# Patient Record
Sex: MALE | Race: OTHER | Employment: OTHER | URBAN - METROPOLITAN AREA
[De-identification: names, ages, dates, MRNs, and addresses within clinical notes are randomized per-mention and may not be internally consistent; named-entity substitution may affect disease eponyms.]

---

## 2018-06-22 ENCOUNTER — HOSPITAL ENCOUNTER (EMERGENCY)
Facility: HOSPITAL | Age: 58
Discharge: HOME | End: 2018-06-22
Attending: PHYSICIAN ASSISTANT
Payer: COMMERCIAL

## 2018-06-22 ENCOUNTER — APPOINTMENT (OUTPATIENT)
Dept: CT IMAGING | Facility: HOSPITAL | Age: 58
End: 2018-06-22
Attending: PHYSICIAN ASSISTANT
Payer: COMMERCIAL

## 2018-06-22 VITALS
HEIGHT: 69 IN | SYSTOLIC BLOOD PRESSURE: 121 MMHG | DIASTOLIC BLOOD PRESSURE: 90 MMHG | RESPIRATION RATE: 18 BRPM | BODY MASS INDEX: 30.36 KG/M2 | OXYGEN SATURATION: 96 % | WEIGHT: 205 LBS | TEMPERATURE: 98 F | HEART RATE: 70 BPM

## 2018-06-22 DIAGNOSIS — D64.9 ANEMIA, UNSPECIFIED TYPE: ICD-10-CM

## 2018-06-22 DIAGNOSIS — K43.9 VENTRAL HERNIA WITHOUT OBSTRUCTION OR GANGRENE: Primary | ICD-10-CM

## 2018-06-22 DIAGNOSIS — R79.89 ELEVATED SERUM CREATININE: ICD-10-CM

## 2018-06-22 PROCEDURE — 85025 COMPLETE CBC W/AUTO DIFF WBC: CPT

## 2018-06-22 PROCEDURE — 99285 EMERGENCY DEPT VISIT HI MDM: CPT

## 2018-06-22 PROCEDURE — 80048 BASIC METABOLIC PNL TOTAL CA: CPT | Performed by: PHYSICIAN ASSISTANT

## 2018-06-22 PROCEDURE — 96376 TX/PRO/DX INJ SAME DRUG ADON: CPT

## 2018-06-22 PROCEDURE — 81003 URINALYSIS AUTO W/O SCOPE: CPT | Performed by: PHYSICIAN ASSISTANT

## 2018-06-22 PROCEDURE — 80048 BASIC METABOLIC PNL TOTAL CA: CPT

## 2018-06-22 PROCEDURE — 85025 COMPLETE CBC W/AUTO DIFF WBC: CPT | Performed by: PHYSICIAN ASSISTANT

## 2018-06-22 PROCEDURE — 74177 CT ABD & PELVIS W/CONTRAST: CPT | Performed by: PHYSICIAN ASSISTANT

## 2018-06-22 PROCEDURE — 96375 TX/PRO/DX INJ NEW DRUG ADDON: CPT

## 2018-06-22 PROCEDURE — 96374 THER/PROPH/DIAG INJ IV PUSH: CPT

## 2018-06-22 RX ORDER — HYDROCODONE BITARTRATE AND ACETAMINOPHEN 5; 325 MG/1; MG/1
1 TABLET ORAL EVERY 6 HOURS PRN
Qty: 12 TABLET | Refills: 0 | Status: SHIPPED | OUTPATIENT
Start: 2018-06-22 | End: 2018-06-29

## 2018-06-22 RX ORDER — KETOROLAC TROMETHAMINE 15 MG/ML
15 INJECTION, SOLUTION INTRAMUSCULAR; INTRAVENOUS ONCE
Status: COMPLETED | OUTPATIENT
Start: 2018-06-22 | End: 2018-06-22

## 2018-06-22 RX ORDER — MORPHINE SULFATE 4 MG/ML
4 INJECTION, SOLUTION INTRAMUSCULAR; INTRAVENOUS ONCE
Status: COMPLETED | OUTPATIENT
Start: 2018-06-22 | End: 2018-06-22

## 2018-06-22 RX ORDER — KETOROLAC TROMETHAMINE 30 MG/ML
30 INJECTION, SOLUTION INTRAMUSCULAR; INTRAVENOUS ONCE
Status: DISCONTINUED | OUTPATIENT
Start: 2018-06-22 | End: 2018-06-22

## 2018-06-22 RX ORDER — ONDANSETRON 2 MG/ML
4 INJECTION INTRAMUSCULAR; INTRAVENOUS ONCE
Status: COMPLETED | OUTPATIENT
Start: 2018-06-22 | End: 2018-06-22

## 2018-06-22 RX ORDER — MORPHINE SULFATE 4 MG/ML
2 INJECTION, SOLUTION INTRAMUSCULAR; INTRAVENOUS ONCE
Status: COMPLETED | OUTPATIENT
Start: 2018-06-22 | End: 2018-06-22
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
## 2018-07-20 ENCOUNTER — HOSPITAL ENCOUNTER (INPATIENT)
Facility: HOSPITAL | Age: 58
LOS: 1 days | Discharge: NURSING, REHAB OR LONG-TERM CARE | DRG: 064 | End: 2018-07-25
Attending: EMERGENCY MEDICINE | Admitting: HOSPITALIST
Payer: COMMERCIAL

## 2018-07-20 ENCOUNTER — APPOINTMENT (OUTPATIENT)
Dept: GENERAL RADIOLOGY | Facility: HOSPITAL | Age: 58
DRG: 064 | End: 2018-07-20
Attending: EMERGENCY MEDICINE
Payer: COMMERCIAL

## 2018-07-20 DIAGNOSIS — R07.9 ACUTE CHEST PAIN: Primary | ICD-10-CM

## 2018-07-20 DIAGNOSIS — R42 DIZZINESS AND GIDDINESS: ICD-10-CM

## 2018-07-20 DIAGNOSIS — I77.74 VERTEBRAL ARTERY DISSECTION (HCC): ICD-10-CM

## 2018-07-20 DIAGNOSIS — I63.012 CEREBROVASCULAR ACCIDENT (CVA) DUE TO THROMBOSIS OF LEFT VERTEBRAL ARTERY (HCC): ICD-10-CM

## 2018-07-20 LAB
ALBUMIN SERPL-MCNC: 3.3 G/DL (ref 3.5–4.8)
ALBUMIN/GLOB SERPL: 0.7 {RATIO} (ref 1–2)
ALP LIVER SERPL-CCNC: 264 U/L (ref 45–117)
ALT SERPL-CCNC: 69 U/L (ref 17–63)
ANION GAP SERPL CALC-SCNC: 10 MMOL/L (ref 0–18)
APTT PPP: 28.3 SECONDS (ref 26.1–34.6)
AST SERPL-CCNC: 34 U/L (ref 15–41)
BASOPHILS # BLD AUTO: 0.08 X10(3) UL (ref 0–0.1)
BASOPHILS NFR BLD AUTO: 1.1 %
BILIRUB SERPL-MCNC: 0.3 MG/DL (ref 0.1–2)
BUN BLD-MCNC: 25 MG/DL (ref 8–20)
BUN/CREAT SERPL: 16.6 (ref 10–20)
CALCIUM BLD-MCNC: 9 MG/DL (ref 8.3–10.3)
CHLORIDE SERPL-SCNC: 106 MMOL/L (ref 101–111)
CO2 SERPL-SCNC: 25 MMOL/L (ref 22–32)
CREAT BLD-MCNC: 1.51 MG/DL (ref 0.7–1.3)
EOSINOPHIL # BLD AUTO: 0.44 X10(3) UL (ref 0–0.3)
EOSINOPHIL NFR BLD AUTO: 6 %
ERYTHROCYTE [DISTWIDTH] IN BLOOD BY AUTOMATED COUNT: 13.6 % (ref 11.5–16)
GLOBULIN PLAS-MCNC: 4.6 G/DL (ref 2.5–3.7)
GLUCOSE BLD-MCNC: 107 MG/DL (ref 70–99)
HCT VFR BLD AUTO: 33.3 % (ref 37–53)
HGB BLD-MCNC: 11.4 G/DL (ref 13–17)
IMMATURE GRANULOCYTE COUNT: 0.02 X10(3) UL (ref 0–1)
IMMATURE GRANULOCYTE RATIO %: 0.3 %
INR BLD: 1 (ref 0.9–1.1)
LYMPHOCYTES # BLD AUTO: 1.77 X10(3) UL (ref 0.9–4)
LYMPHOCYTES NFR BLD AUTO: 24.3 %
M PROTEIN MFR SERPL ELPH: 7.9 G/DL (ref 6.1–8.3)
MCH RBC QN AUTO: 29.4 PG (ref 27–33.2)
MCHC RBC AUTO-ENTMCNC: 34.2 G/DL (ref 31–37)
MCV RBC AUTO: 85.8 FL (ref 80–99)
MONOCYTES # BLD AUTO: 0.56 X10(3) UL (ref 0.1–1)
MONOCYTES NFR BLD AUTO: 7.7 %
NEUTROPHIL ABS PRELIM: 4.41 X10 (3) UL (ref 1.3–6.7)
NEUTROPHILS # BLD AUTO: 4.41 X10(3) UL (ref 1.3–6.7)
NEUTROPHILS NFR BLD AUTO: 60.6 %
OSMOLALITY SERPL CALC.SUM OF ELEC: 297 MOSM/KG (ref 275–295)
PLATELET # BLD AUTO: 227 10(3)UL (ref 150–450)
POTASSIUM SERPL-SCNC: 3.7 MMOL/L (ref 3.6–5.1)
PSA SERPL DL<=0.01 NG/ML-MCNC: 13.6 SECONDS (ref 12.4–14.7)
RBC # BLD AUTO: 3.88 X10(6)UL (ref 4.3–5.7)
RED CELL DISTRIBUTION WIDTH-SD: 42.9 FL (ref 35.1–46.3)
SODIUM SERPL-SCNC: 141 MMOL/L (ref 136–144)
TROPONIN I SERPL-MCNC: <0.046 NG/ML (ref ?–0.05)
WBC # BLD AUTO: 7.3 X10(3) UL (ref 4–13)

## 2018-07-20 PROCEDURE — 99220 INITIAL OBSERVATION CARE,LEVL III: CPT | Performed by: HOSPITALIST

## 2018-07-20 PROCEDURE — 71045 X-RAY EXAM CHEST 1 VIEW: CPT | Performed by: EMERGENCY MEDICINE

## 2018-07-20 RX ORDER — ENOXAPARIN SODIUM 100 MG/ML
40 INJECTION SUBCUTANEOUS DAILY
Status: CANCELLED | OUTPATIENT
Start: 2018-07-20

## 2018-07-20 RX ORDER — ASPIRIN 81 MG/1
81 TABLET, CHEWABLE ORAL DAILY
COMMUNITY
End: 2018-07-20

## 2018-07-20 RX ORDER — ATORVASTATIN CALCIUM 80 MG/1
80 TABLET, FILM COATED ORAL NIGHTLY
Status: ON HOLD | COMMUNITY
End: 2019-05-25

## 2018-07-20 RX ORDER — LACTULOSE 20 G/30ML
20 SOLUTION ORAL 3 TIMES DAILY
COMMUNITY
End: 2018-07-20

## 2018-07-20 RX ORDER — NITROGLYCERIN 0.4 MG/1
0.4 TABLET SUBLINGUAL ONCE
Status: COMPLETED | OUTPATIENT
Start: 2018-07-20 | End: 2018-07-20

## 2018-07-20 RX ORDER — ASPIRIN 81 MG/1
324 TABLET, CHEWABLE ORAL ONCE
Status: COMPLETED | OUTPATIENT
Start: 2018-07-20 | End: 2018-07-20

## 2018-07-20 RX ORDER — AMLODIPINE BESYLATE 10 MG/1
10 TABLET ORAL DAILY
Status: ON HOLD | COMMUNITY
End: 2019-05-25

## 2018-07-20 RX ORDER — LEVOTHYROXINE SODIUM 0.2 MG/1
200 TABLET ORAL
COMMUNITY

## 2018-07-20 RX ORDER — TAMSULOSIN HYDROCHLORIDE 0.4 MG/1
0.4 CAPSULE ORAL DAILY
COMMUNITY
End: 2018-07-20

## 2018-07-20 RX ORDER — LOSARTAN POTASSIUM 50 MG/1
50 TABLET ORAL DAILY
Status: ON HOLD | COMMUNITY
End: 2019-05-25

## 2018-07-20 RX ORDER — CLOPIDOGREL BISULFATE 75 MG/1
75 TABLET ORAL DAILY
Status: ON HOLD | COMMUNITY
End: 2018-07-25

## 2018-07-20 RX ORDER — ONDANSETRON 2 MG/ML
4 INJECTION INTRAMUSCULAR; INTRAVENOUS ONCE
Status: COMPLETED | OUTPATIENT
Start: 2018-07-20 | End: 2018-07-20

## 2018-07-20 RX ORDER — SULFAMETHOXAZOLE AND TRIMETHOPRIM 800; 160 MG/1; MG/1
1 TABLET ORAL 2 TIMES DAILY
Status: ON HOLD | COMMUNITY
Start: 2018-07-03 | End: 2018-07-23 | Stop reason: ALTCHOICE

## 2018-07-20 RX ORDER — FLUOXETINE HYDROCHLORIDE 20 MG/1
20 CAPSULE ORAL DAILY
COMMUNITY
End: 2018-07-20

## 2018-07-20 RX ORDER — MORPHINE SULFATE 4 MG/ML
4 INJECTION, SOLUTION INTRAMUSCULAR; INTRAVENOUS ONCE
Status: COMPLETED | OUTPATIENT
Start: 2018-07-20 | End: 2018-07-20

## 2018-07-21 LAB
ATRIAL RATE: 75 BPM
CHOLEST SMN-MCNC: 122 MG/DL (ref ?–200)
HDLC SERPL-MCNC: 23 MG/DL (ref 45–?)
HDLC SERPL: 5.3 {RATIO} (ref ?–4.97)
LDLC SERPL CALC-MCNC: 55 MG/DL (ref ?–130)
NONHDLC SERPL-MCNC: 99 MG/DL (ref ?–130)
P AXIS: 53 DEGREES
P-R INTERVAL: 194 MS
POTASSIUM SERPL-SCNC: 4.1 MMOL/L (ref 3.6–5.1)
Q-T INTERVAL: 414 MS
QRS DURATION: 96 MS
QTC CALCULATION (BEZET): 462 MS
R AXIS: -45 DEGREES
T AXIS: 154 DEGREES
TRIGL SERPL-MCNC: 222 MG/DL (ref ?–150)
TROPONIN I SERPL-MCNC: <0.046 NG/ML (ref ?–0.05)
TROPONIN I SERPL-MCNC: <0.046 NG/ML (ref ?–0.05)
VENTRICULAR RATE: 75 BPM
VLDLC SERPL CALC-MCNC: 44 MG/DL (ref 5–40)

## 2018-07-21 PROCEDURE — 99226 SUBSEQUENT OBSERVATION CARE: CPT | Performed by: HOSPITALIST

## 2018-07-21 RX ORDER — LEVOTHYROXINE SODIUM 0.2 MG/1
200 TABLET ORAL
Status: DISCONTINUED | OUTPATIENT
Start: 2018-07-21 | End: 2018-07-25

## 2018-07-21 RX ORDER — TRAMADOL HYDROCHLORIDE 50 MG/1
50 TABLET ORAL EVERY 6 HOURS PRN
Status: DISCONTINUED | OUTPATIENT
Start: 2018-07-21 | End: 2018-07-25

## 2018-07-21 RX ORDER — CLOPIDOGREL BISULFATE 75 MG/1
75 TABLET ORAL DAILY
Status: DISCONTINUED | OUTPATIENT
Start: 2018-07-21 | End: 2018-07-25

## 2018-07-21 RX ORDER — AMLODIPINE BESYLATE 5 MG/1
10 TABLET ORAL DAILY
Status: DISCONTINUED | OUTPATIENT
Start: 2018-07-21 | End: 2018-07-25

## 2018-07-21 RX ORDER — OXYCODONE AND ACETAMINOPHEN 7.5; 325 MG/1; MG/1
1 TABLET ORAL EVERY 6 HOURS PRN
Status: ON HOLD | COMMUNITY
End: 2018-07-25

## 2018-07-21 RX ORDER — ACETAMINOPHEN 325 MG/1
650 TABLET ORAL EVERY 6 HOURS PRN
Status: DISCONTINUED | OUTPATIENT
Start: 2018-07-21 | End: 2018-07-25

## 2018-07-21 RX ORDER — ONDANSETRON 2 MG/ML
4 INJECTION INTRAMUSCULAR; INTRAVENOUS EVERY 6 HOURS PRN
Status: DISCONTINUED | OUTPATIENT
Start: 2018-07-21 | End: 2018-07-24

## 2018-07-21 RX ORDER — LOSARTAN POTASSIUM 50 MG/1
50 TABLET ORAL DAILY
Status: DISCONTINUED | OUTPATIENT
Start: 2018-07-21 | End: 2018-07-25

## 2018-07-21 RX ORDER — ASPIRIN 325 MG
325 TABLET ORAL DAILY
Status: DISCONTINUED | OUTPATIENT
Start: 2018-07-21 | End: 2018-07-21

## 2018-07-21 RX ORDER — POTASSIUM CHLORIDE 20 MEQ/1
40 TABLET, EXTENDED RELEASE ORAL ONCE
Status: COMPLETED | OUTPATIENT
Start: 2018-07-21 | End: 2018-07-21

## 2018-07-21 RX ORDER — HEPARIN SODIUM 5000 [USP'U]/ML
5000 INJECTION, SOLUTION INTRAVENOUS; SUBCUTANEOUS EVERY 12 HOURS SCHEDULED
Status: DISCONTINUED | OUTPATIENT
Start: 2018-07-21 | End: 2018-07-21

## 2018-07-21 RX ORDER — ATORVASTATIN CALCIUM 80 MG/1
80 TABLET, FILM COATED ORAL NIGHTLY
Status: DISCONTINUED | OUTPATIENT
Start: 2018-07-21 | End: 2018-07-25

## 2018-07-21 RX ORDER — SULFAMETHOXAZOLE AND TRIMETHOPRIM 800; 160 MG/1; MG/1
1 TABLET ORAL 2 TIMES DAILY
Status: DISCONTINUED | OUTPATIENT
Start: 2018-07-21 | End: 2018-07-23

## 2018-07-21 RX ORDER — ASPIRIN 81 MG/1
81 TABLET, CHEWABLE ORAL DAILY
Status: DISCONTINUED | OUTPATIENT
Start: 2018-07-21 | End: 2018-07-25

## 2018-07-21 NOTE — ED NOTES
Patient and family refuse transfer to [address] [place] [address] feel they are not receiving appropriate care.  Dr Starla Ace explained possibility of financial burden, Both patient and family state they will \"pay cash they have a lot of money\"  Also request Dr. Joseph Brown for c

## 2018-07-21 NOTE — CONSULTS
[facility]  Report of Consultation    Aminata Edmond Patient Status:  Observation    10/1/1960 MRN [id number]   [facility] 8NE-A Attending Edel Mario MD   Hosp Day # 0 PCP PHYSICIAN NONSTAFF     Reason Lipids Paternal Uncle    • Cancer Paternal Uncle       reports that he has been smoking. He has a 10.00 pack-year smoking history. He has never used smokeless tobacco. He reports that he drinks alcohol. He reports that he does not use drugs.     Allergies: Alert and oriented, normal affect. Skin: Warm and dry.      Laboratories and Data:  Diagnostics:    Labs:     Lab Results  Component Value Date   WBC 7.3 07/20/2018   RBC 3.88 07/20/2018   HGB 11.4 07/20/2018   HCT 33.3 07/20/2018   MCV 85.8 07/20/2018   M

## 2018-07-21 NOTE — ED PROVIDER NOTES
Patient Seen in: [facility] Emergency Department    History   Patient presents with:  Chest Pain Angina (cardiovascular)    Stated Complaint: cp    HPI    This is a 25-KDZL-DUNCAN male with complicated medical history including MI with 3 stents on Plav Wt 90.7 kg   SpO2 100%   BMI 29.53 kg/m²         Physical Exam    GENERAL: Awake, alert oriented x3, nontoxic appearing. SKIN: Normal, warm, and dry. HEENT:  Pupils equally round and reactive to light. Conjuctiva clear.   Oropharynx is clear and moist. T-wave abnormality.   No old for comparison           ED Course as of Jul 20 2256  ------------------------------------------------------------      [facility]     Admission disposition: 7/20/2018 10:42 PM         Patient placed on cardiac monitor, continuous puls

## 2018-07-21 NOTE — PROGRESS NOTES
[facility]  Progress Note     Mela Bynum Patient Status:  Observation    10/1/1960 MRN [id number]   [facility] 8NE-A Attending Obed Us MD   Hosp Day # 0 PCP PHYSICIAN NONSTAFF     Chief Complaint: Chest pain    S: Patient apixaban  5 mg Oral BID   • aspirin  81 mg Oral Daily     Cath 11/2/2017    --  CORONARY CIRCULATION: There was severe coronary artery disease. Left main: There was a 10 % stenosis. Proximal LAD:  There was a 50 % stenosis.  Distal LAD: There was a 80 % tricia current  cigarette use, and a family history of coronary artery disease.  There was no history of congestive heart failure,  cardiogenic shock, cerebrovascular disease, chronic lung disease, diabetes, cardiomyopathy, or cardiac arrest. PRIOR  CARDIOVASCULAR left  circumflex.  Ostial lesion: There is a discrete, 30%stenosis.  Proxi  mal vessel lesion: There is a diffuse, 0% in-stent restenosis.  Mid-  vessel lesion: There is a diffuse, 30%stenosis.   1st obtuse marginal: The distal vessel is small; it has sever services. If all in agreement, home today with outpatient follow-up. Plan of care discussed with patient and RN. Harper Guthrie MD    ADDENDUM  Per discussion with neuro service, patient is receiving appropriate therapy.      Yasmin MOORE

## 2018-07-21 NOTE — PLAN OF CARE
Complained of nausea and dizziness when he sat up 45 degrees in bed to take his oral meds  Claims this also happens at home , when he stands up he gets nauseated and dizzy, relieved gradually at rest but takes a long time resting  PT called

## 2018-07-21 NOTE — PROGRESS NOTES
Admit tonight from home w/ CP as well as chronic back/neck pain, recent stenting (11/17) and CVA (6/18). Only on plavix.   Denies CP at this time, a&ox4, delayed responses (from CVA), RA, NSR, voids in urinal, up +1/walker, fall risk  Plan for repeat trop i

## 2018-07-21 NOTE — H&P
[facility]  History and Physical     Valery Wetzel Patient Status:  Emergency    10/1/1960 MRN [id number]   Location [address] [facility] Attending Esther Gamble, [address] [facility] Day # 0 PCP PHYSICIAN NONSTAFF     Chief Complaint: Juan Carlos Monet oriented x 3. HEENT: Normocephalic atraumatic. Moist mucous membranes. EOM-I. PERRLA. Neck: No JVD. No carotid bruits. Respiratory: Clear to auscultation bilaterally. No wheezes. Cardiovascular: S1, S2. Regular rate and rhythm.  No murmurs, rubs or ga

## 2018-07-21 NOTE — ED INITIAL ASSESSMENT (HPI)
Patient was discharged from [facility] 3 days ago s/p cva here tonight with c/o chest pain began 1 hour ago and dizziness.

## 2018-07-21 NOTE — PROGRESS NOTES
07/21/18 0016 07/21/18 0018 07/21/18 0019   Vital Signs   /76 126/75 (!) 128/94   BP Location Right arm Right arm Right arm   BP Method Automatic Automatic Automatic   Patient Position Lying Sitting Standing     Orthostatic negative

## 2018-07-21 NOTE — PAYOR COMM NOTE
--------------  ADMISSION REVIEW     Payor: Redd Pringle #:  [id number]  Authorization Number: N/A    Admit date: N/A  Admit time: N/A       Admitting Physician: Clifford Orozco MD  Attending Physician:  Poncho Tafoya MD  Primary Care Physician and negative except as noted above.     Physical Exam   ED Triage Vitals  BP: (!) 132/91 [07/20/18 2046]  Pulse: 74 [07/20/18 2046]  Resp: 18 [07/20/18 2046]  Temp: 98.5 °F (36.9 °C) [07/20/18 2046]  Temp src: Temporal [07/20/18 2046]  SpO2: 100 % [07/20/18 10:42 PM    Patient placed on cardiac monitor, continuous pulse oximetry and IV line was established of normal saline. Patient was given baby aspirin and nitroglycerin. EKG showed a sinus rhythm with T-wave changes in the lateral leads.     I can read the EMERGENCY DEPARTMENT Attending Petar Porras DO   Hosp Day # 0 PCP PHYSICIAN NONSTAFF     Chief Complaint: Chest pain    History of Present Illness: Da Christianson is a 56year old male with a PMH of CAD, cerebrovascular disease, HTN, and hypothyroidism w affect.     Diagnostic Data:      Labs:  Recent Labs   Lab  07/20/18 2033   WBC  7.3   HGB  11.4*   MCV  85.8   PLT  227.0   INR  1.00     Lab  07/20/18 2033   GLU  107*   BUN  25*   CREATSERUM  1.51*   GFRAA  59*   GFRNAA  51*   CA  9.0   ALB  3.3*   N Route User    7/21/2018 0930 Given 75 mg Oral Hussein Marx, RN      Levothyroxine Sodium (SYNTHROID) tab 200 mcg     Date Action Dose Route User    7/21/2018 0437 Given 200 mcg Oral Stefan East RN      Losartan Potassium (COZAAR) tab 50 mg     Date A

## 2018-07-21 NOTE — CM/SW NOTE
Patient currently out of network, will need transfer to in network facility when medically stable.     Valdo Myles RN,   Phone [phone]  Pager [phone]

## 2018-07-22 ENCOUNTER — APPOINTMENT (OUTPATIENT)
Dept: MRI IMAGING | Facility: HOSPITAL | Age: 58
DRG: 064 | End: 2018-07-22
Attending: Other
Payer: COMMERCIAL

## 2018-07-22 ENCOUNTER — APPOINTMENT (OUTPATIENT)
Dept: CV DIAGNOSTICS | Facility: HOSPITAL | Age: 58
DRG: 064 | End: 2018-07-22
Attending: INTERNAL MEDICINE
Payer: COMMERCIAL

## 2018-07-22 LAB
ANA SCREEN: POSITIVE
ASPIRIN PLT INHIBITION: 454 ARU (ref 620–672)
CENTROMERE AUTOAB: <100 AU/ML (ref ?–100)
DSDNA AUTOAB: 121 IU/ML (ref ?–100)
HISTONE AUTOAB: <100 AU/ML (ref ?–100)
HOMOCYSTEINE: 15.5 UMOL/L (ref 5–13.9)
JO-1 AUTOAB: <100 AU/ML (ref ?–100)
P2Y12 REACTION UNITS: 271 PRU
RHEUMATOID FACT SERPL-ACNC: <10 IU/ML (ref ?–15)
RNP AUTOAB: <100 AU/ML (ref ?–100)
SCL-70 AUTOAB: <100 AU/ML (ref ?–100)
SED RATE-ML: 73 MM/HR (ref 0–12)
SM AUTOAB (SMITH): <100 AU/ML (ref ?–100)
SSA AUTOAB: <100 AU/ML (ref ?–100)
SSB AUTOAB: <100 AU/ML (ref ?–100)

## 2018-07-22 PROCEDURE — 70549 MR ANGIOGRAPH NECK W/O&W/DYE: CPT | Performed by: OTHER

## 2018-07-22 PROCEDURE — 99254 IP/OBS CNSLTJ NEW/EST MOD 60: CPT | Performed by: OTHER

## 2018-07-22 PROCEDURE — 93306 TTE W/DOPPLER COMPLETE: CPT | Performed by: INTERNAL MEDICINE

## 2018-07-22 PROCEDURE — 70553 MRI BRAIN STEM W/O & W/DYE: CPT | Performed by: OTHER

## 2018-07-22 RX ORDER — LORAZEPAM 2 MG/ML
1 INJECTION INTRAMUSCULAR ONCE
Status: COMPLETED | OUTPATIENT
Start: 2018-07-22 | End: 2018-07-22

## 2018-07-22 NOTE — CONSULTS
Anthony 1827   Neurology; INITIAL Consultation VISIT  2018, 9:42 AM     Renita Blackmon Patient Status:  Observation    10/1/1960 MRN [id number]   [facility] 8NE-A Attending Lauren Odom MD     PCP PHYSICIAN NON REPAIR ING HERNIA,5+Y/O,REDUCIBL  No date: STENT PL SINGLE VES      Comment: 2 cardiac stents x 2 2006 2x in 2014 3x in                2017    FAMILY HISTORY:  family history includes Cancer in his paternal uncle; Diabetes in his paternal grandfather and p DIAGNOSTIC DATA:     IMAGING:  July 12 CTA mj  CTA of the head and neck demonstrate complete cessation of flow at   the proximal segment of left V4 (3/197) consistent with occlusion of   the left vertebral artery segment 4.  There is minimal opacifica

## 2018-07-22 NOTE — PROGRESS NOTES
[facility]  Progress Note     Zoey Dee Patient Status:  Observation    10/1/1960 MRN [id number]   [facility] 8NE-A Attending Lia Quezada MD   Hosp Day # 0 PCP PHYSICIAN NONSTAFF     Chief Complaint: Chest pain    S: Patient mg Oral BID   • aspirin  81 mg Oral Daily     Cath 11/2/2017    --  CORONARY CIRCULATION: There was severe coronary artery disease. Left main: There was a 10 % stenosis. Proximal LAD:  There was a 50 % stenosis. Distal LAD: There was a 80 % stenosis.  1st d current  cigarette use, and a family history of coronary artery disease.  There was no history of congestive heart failure,  cardiogenic shock, cerebrovascular disease, chronic lung disease, diabetes, cardiomyopathy, or cardiac arrest. PRIOR  CARDIOVASCULAR left  circumflex.  Ostial lesion: There is a discrete, 30%stenosis.  Proxi  mal vessel lesion: There is a diffuse, 0% in-stent restenosis.  Mid-  vessel lesion: There is a diffuse, 30%stenosis.   1st obtuse marginal: The distal vessel is small; it has sever

## 2018-07-22 NOTE — PLAN OF CARE
Problem: CARDIOVASCULAR - ADULT  Goal: Maintains optimal cardiac output and hemodynamic stability  INTERVENTIONS:  - Monitor vital signs, rhythm, and trends  - Monitor for bleeding, hypotension and signs of decreased cardiac output  - Evaluate effectivenes felt like he was spinning and he could not describe.

## 2018-07-22 NOTE — PLAN OF CARE
Pt c/o of dizziness when sitting up. States he has to eat his breakfast half lying down because its uncomfortable to sit even at 45 degrees. Safety education on aspiration provided to pt, pt stated he \"has to do what he has to do\".  Seen by neuro with new

## 2018-07-22 NOTE — PROGRESS NOTES
[facility]  Progress Note    Angela Brand Patient Status:  Observation    10/1/1960 MRN [id number]   [facility] 8NE-A Attending Manasa Lowe MD   Hosp Day # 0 PCP PHYSICIAN NONSTAFF       Assessment and Plan:  Patient Active Problem Exam:  Blood pressure 133/84, pulse 68, temperature 98 °F (36.7 °C), temperature source Oral, resp. rate 18, height 5' 9\" (1.753 m), weight 197 lb 6.4 oz (89.5 kg), SpO2 98 %. General: Alert and oriented in no apparent distress.   HEENT: No focal deficits

## 2018-07-23 ENCOUNTER — APPOINTMENT (OUTPATIENT)
Dept: MRI IMAGING | Facility: HOSPITAL | Age: 58
DRG: 064 | End: 2018-07-23
Attending: PHYSICIAN ASSISTANT
Payer: COMMERCIAL

## 2018-07-23 LAB
ANION GAP SERPL CALC-SCNC: 10 MMOL/L (ref 0–18)
BUN BLD-MCNC: 23 MG/DL (ref 8–20)
BUN/CREAT SERPL: 14.4 (ref 10–20)
CALCIUM BLD-MCNC: 9.3 MG/DL (ref 8.3–10.3)
CHLORIDE SERPL-SCNC: 104 MMOL/L (ref 101–111)
CO2 SERPL-SCNC: 22 MMOL/L (ref 22–32)
CREAT BLD-MCNC: 1.6 MG/DL (ref 0.7–1.3)
CRP SERPL-MCNC: 0.41 MG/DL (ref ?–1)
EST. AVERAGE GLUCOSE BLD GHB EST-MCNC: 114 MG/DL (ref 68–126)
GLUCOSE BLD-MCNC: 82 MG/DL (ref 70–99)
HBA1C MFR BLD HPLC: 5.6 % (ref ?–5.7)
OSMOLALITY SERPL CALC.SUM OF ELEC: 285 MOSM/KG (ref 275–295)
POTASSIUM SERPL-SCNC: 4.4 MMOL/L (ref 3.6–5.1)
SODIUM SERPL-SCNC: 136 MMOL/L (ref 136–144)

## 2018-07-23 PROCEDURE — 99224 SUBSEQUENT OBSERVATION CARE: CPT | Performed by: HOSPITALIST

## 2018-07-23 PROCEDURE — 99233 SBSQ HOSP IP/OBS HIGH 50: CPT | Performed by: OTHER

## 2018-07-23 PROCEDURE — 99255 IP/OBS CONSLTJ NEW/EST HI 80: CPT | Performed by: INTERNAL MEDICINE

## 2018-07-23 RX ORDER — ONDANSETRON 4 MG/1
4 TABLET, ORALLY DISINTEGRATING ORAL EVERY 8 HOURS PRN
Qty: 10 TABLET | Refills: 0 | Status: ON HOLD | OUTPATIENT
Start: 2018-07-23 | End: 2019-05-25

## 2018-07-23 RX ORDER — LORAZEPAM 2 MG/ML
1 INJECTION INTRAMUSCULAR ONCE
Status: COMPLETED | OUTPATIENT
Start: 2018-07-23 | End: 2018-07-23

## 2018-07-23 NOTE — PROGRESS NOTES
[facility]  Cardiology Progress Note    Subjective:  No chest pain or shortness of breath. Still markedly dizzy when upright even a little. Unable to eat.   States he feels like he has been \"in senior living\" for 10 months due to inactivity, as symptoms hav diet.    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     · Hypothyroidism  · HTN  · C

## 2018-07-23 NOTE — PHYSICAL THERAPY NOTE
PHYSICAL THERAPY EVALUATION - INPATIENT     Room Number: 3369/5439-U  Evaluation Date: 7/23/2018  Type of Evaluation: Initial  Physician Order: PT Eval and Treat (recent stroke and fall, L sided weakness)    Presenting Problem: Chest pain  Reason for The Visual impairment     L eye - lazy eye       Past Surgical History  Past Surgical History:  No date: CATH DRUG ELUTING STENT  No date: CATH PERCUTANEOUS  TRANSLUMINAL CORONARY ANGIO*  2012: HERNIA SURGERY  No date: KIDNEY SURGERY      Comment: for stone  N appears intact  · Following Commands:  follows multistep commands with increased time and follows multistep commands with repetition  · Initiation: cues to initiate tasks    RANGE OF MOTION AND STRENGTH ASSESSMENT  Lower extremity ROM is within functional 45.44   CMS Modifier (G-Code): CK    FUNCTIONAL ABILITY STATUS  Gait Assessment   Gait Assistance: Maximum assistance (Max A per FIM but required Min A)  Distance (ft): 50  Assistive Device: Rolling walker  Pattern:  (decreased step length and clearance, g and gait. Functional outcome measures completed include gait speed and AM-PAC. The patient ambulates with self-selected gait speed = .16 meters/second.   This correlates with discharge to skilled nursing facility and need for intervention to reduce fall r

## 2018-07-23 NOTE — CM/SW NOTE
Met with pt and family member @ bedside--explained therapy recommendations of SAT--interested in any facilities near his residence --ie [place], [facility], [place], [facility] or [facility]--[facility] referrals sent to [facility], Mylene vázquez

## 2018-07-23 NOTE — OCCUPATIONAL THERAPY NOTE
OCCUPATIONAL THERAPY EVALUATION - INPATIENT     Room Number: 6998/6213-B  Evaluation Date: 7/23/2018  Type of Evaluation: Initial  Presenting Problem: Acute chest pain    Physician Order: IP Consult to Occupational Therapy  Reason for Therapy: ADL/IADL Dys L eye - lazy eye       Past Surgical History  Past Surgical History:  No date: CATH DRUG ELUTING STENT  No date: CATH PERCUTANEOUS  TRANSLUMINAL CORONARY ANGIO*  2012: HERNIA SURGERY  No date: KIDNEY SURGERY      Comment: for stone  No date: REPAIR ING stated. OBJECTIVE  Precautions: None  Fall Risk: Standard fall risk    WEIGHT BEARING RESTRICTION  Weight Bearing Restriction: None        PAIN ASSESSMENT  Ratin  Location: Neck, Back  Management Techniques:  Activity promotion;Repositioning    COGNI activity 85 bpm  Blood Pressure: Supine 117/74, Sitting 102/75 and Standing 99/75   BP after ambulating 50 feet (initiated in standing but pt symptomatic and reporting he may \"pass out\" and thus completed stand>sit to chair)= 117/89     ACTIVITIES OF OLIVA questions. Patient End of Session: Call light within reach;Up in chair;RN aware of session/findings; All patient questions and concerns addressed    ASSESSMENT     Patient is a 56year old male admitted on 7/20/2018 for chest pain.  Complete medical history education;Continued evaluation  Rehab Potential : Good  Frequency (Obs): 5x/week  Number of Visits to Meet Established Goals: 5    ADL Goals   Patient will perform upper body dressing:  with mod assist  Patient will perform lower body dressing:  with mod a

## 2018-07-23 NOTE — PROGRESS NOTES
[facility]  Progress Note     Pansy Few Patient Status:  Observation    10/1/1960 MRN [id number]   [facility] 8NE-A Attending Sulema Vigil MD   Hosp Day # 0 PCP PHYSICIAN NONSTAFF     Chief Complaint: Chest pain    S: Patient mg Oral BID   • aspirin  81 mg Oral Daily     Cath 11/2/2017    --  CORONARY CIRCULATION: There was severe coronary artery disease. Left main: There was a 10 % stenosis. Proximal LAD:  There was a 50 % stenosis. Distal LAD: There was a 80 % stenosis.  1st d current  cigarette use, and a family history of coronary artery disease.  There was no history of congestive heart failure,  cardiogenic shock, cerebrovascular disease, chronic lung disease, diabetes, cardiomyopathy, or cardiac arrest. PRIOR  CARDIOVASCULAR left  circumflex.  Ostial lesion: There is a discrete, 30%stenosis.  Proxi  mal vessel lesion: There is a diffuse, 0% in-stent restenosis.  Mid-  vessel lesion: There is a diffuse, 30%stenosis.   1st obtuse marginal: The distal vessel is small; it has sever

## 2018-07-23 NOTE — PROGRESS NOTES
[address] [facility] Neurology Progress Note    Brenna Human Patient Status:  Observation    10/1/1960 MRN [id number]   [facility] 8NE-A Attending Lilian Lema MD   Hosp Day # 0 PCP PHYSICIAN NONSTAFF         Subjective:  Brenna Human is 3. Chronic microvascular ischemic changes of cerebral white matter and small old lacunar infarcts of right basal ganglia and right centrum semiovale. MRA Carotids w/ + w/out 7/22/18:  FINDINGS:  Motion degraded study.   LEFT INTERNAL CAROTID:    No 7/17/18 admitted to [facility] found to have L cerebellar infarct and L vert dissection, with V4 segment occlusion and MRI with subacute cerebellar infarction  HTN  MI  HLD  Stroke Sept 2017 (R BG)       Plan:  Ischemic stroke order set  Keep -180 for c This is a 55 y/o male with h/o stroke in the L cerebellum and L vertebral artery dissection and occlusion. CTA was dine but ois going to be repeated. Pt history is somewhat confusing and he is a poor historian.  He has been seen at multiple other hospitals

## 2018-07-23 NOTE — CM/SW NOTE
Spoke with renetta in UR and updated her of the current plan--per neuro service, patient will need to repeat the mrs and then decide on any interventions if needed--she was to refer to medical director--await direction if patient needs to be transferred to

## 2018-07-24 ENCOUNTER — APPOINTMENT (OUTPATIENT)
Dept: MRI IMAGING | Facility: HOSPITAL | Age: 58
DRG: 064 | End: 2018-07-24
Attending: PHYSICIAN ASSISTANT
Payer: COMMERCIAL

## 2018-07-24 LAB
BETA 2 GLYCOPROTEIN 1 AB, IGG: <3.7 U/ML (ref ?–15)
BETA 2 GLYCOPROTEIN 1 AB, IGM: 4.4 U/ML (ref ?–15)
HAV IGM SER QL: NONREACTIVE
HBV CORE IGM SER QL: NONREACTIVE
HBV SURFACE AG SERPL QL IA: NONREACTIVE
HCV AB SERPL QL IA: NONREACTIVE
P2Y12 REACTION UNITS: 218 PRU
PHOSPHOLIPID AB, IGG: NEGATIVE
THYROGLOB SERPL-MCNC: >500 U/ML (ref ?–60)
THYROPEROXIDASE AB SERPL-ACNC: 138 U/ML (ref ?–60)

## 2018-07-24 PROCEDURE — 99231 SBSQ HOSP IP/OBS SF/LOW 25: CPT | Performed by: HOSPITALIST

## 2018-07-24 PROCEDURE — 99232 SBSQ HOSP IP/OBS MODERATE 35: CPT | Performed by: OTHER

## 2018-07-24 PROCEDURE — 70549 MR ANGIOGRAPH NECK W/O&W/DYE: CPT | Performed by: PHYSICIAN ASSISTANT

## 2018-07-24 RX ORDER — LORAZEPAM 2 MG/ML
2 INJECTION INTRAMUSCULAR ONCE
Status: COMPLETED | OUTPATIENT
Start: 2018-07-24 | End: 2018-07-24

## 2018-07-24 RX ORDER — MECLIZINE HCL 12.5 MG/1
25 TABLET ORAL 3 TIMES DAILY PRN
Status: DISCONTINUED | OUTPATIENT
Start: 2018-07-24 | End: 2018-07-25

## 2018-07-24 RX ORDER — POLYETHYLENE GLYCOL 3350 17 G/17G
17 POWDER, FOR SOLUTION ORAL DAILY
Status: DISCONTINUED | OUTPATIENT
Start: 2018-07-24 | End: 2018-07-25

## 2018-07-24 RX ORDER — ONDANSETRON 4 MG/1
4 TABLET, FILM COATED ORAL EVERY 8 HOURS PRN
Status: DISCONTINUED | OUTPATIENT
Start: 2018-07-24 | End: 2018-07-25

## 2018-07-24 RX ORDER — SENNOSIDES 8.6 MG
8.6 TABLET ORAL 2 TIMES DAILY
Status: DISCONTINUED | OUTPATIENT
Start: 2018-07-24 | End: 2018-07-25

## 2018-07-24 RX ORDER — DOCUSATE SODIUM 100 MG/1
100 CAPSULE, LIQUID FILLED ORAL 2 TIMES DAILY
Status: DISCONTINUED | OUTPATIENT
Start: 2018-07-24 | End: 2018-07-25

## 2018-07-24 NOTE — CM/SW NOTE
[facility] can clinically accept, checking bed availability.     Marya Vargas, RN,   Phone [phone]  Pager [phone]

## 2018-07-24 NOTE — PROGRESS NOTES
[facility]  Progress Note     Juan Jose Frazier Patient Status:  Observation    10/1/1960 MRN [id number]   [facility] 8NE-A Attending Keiry Lema MD   Hosp Day # 0 PCP PHYSICIAN NONSTAFF     Chief Complaint: Chest pain    S: Patient mg Oral Daily   • Levothyroxine Sodium  200 mcg Oral Before breakfast   • Losartan Potassium  50 mg Oral Daily   • aspirin  81 mg Oral Daily     Cath 11/2/2017    --  CORONARY CIRCULATION: There was severe coronary artery disease.  Left main: There was a 10 prior diagnosis of congestive heart failure. The patient has hypertension, a history of current  cigarette use, and a family history of coronary artery disease.  There was no history of congestive heart failure,  cardiogenic shock, cerebrovascular disease, previous sten  t #1.   Left circumflex: Prior intervention: stent in the proximal left  circumflex.  Ostial lesion: There is a discrete, 30%stenosis.  Proxi  mal vessel lesion: There is a diffuse, 0% in-stent restenosis.  Mid-  vessel lesion: There is a dif

## 2018-07-24 NOTE — CONSULTS
[facility]  Rheumatology Report of Consultation  [facility] Patient Status:  Observation    10/1/1960 MRN [id number]   [facility] 8NE-A Attending Abhinav Lopez MD   Hosp Day # 0 PCP PHYSICIAN NONSTAFF     Date of Admission: 20 current headaches. No current fever. No current chills. No current chest pain. No palpitations. No abdominal pain. After his first stroke he was treated with a combination of aspirin and Plavix.   At the time of his second stroke he was supposed (ULTRAM) tab 50 mg 50 mg Oral Q6H PRN   [COMPLETED] Potassium Chloride ER (K-DUR M20) CR tab 40 mEq 40 mEq Oral Once   aspirin chewable tab 81 mg 81 mg Oral Daily   [COMPLETED] aspirin chewable tab 324 mg 324 mg Oral Once   [COMPLETED] nitroGLYCERIN (NITRO HDL 23 LDL 55. LFTs show increased alkaline phosphatase of 263 AST 34 ALT 69 total bilirubin 0.3. Total globulin elevated at 4.6.     Lab Results  Component Value Date   CREATSERUM 1.60 07/23/2018   BUN 23 07/23/2018    07/23/2018   K 4.4 07/23/2018 coronary artery disease and has had 7 stents placed in his heart. His CAT scan of the abdomen reveals atherosclerosis in his abdominal aorta. Most likely he has atherosclerosis in his cerebral arteries which have left him with strokes.   However recently

## 2018-07-24 NOTE — PROGRESS NOTES
Neurology Progress Note    Cheryal Offer Patient Status:  Inpatient    10/1/1960 MRN [id number]   [facility] 8NE-A Attending Prudencio Buerger, MD   Hosp Day # 0 PCP PHYSICIAN NONSTAFF     Subjective:  Pt was sen and examined in bed this am. seems less likely. I appreciate Rheumatology input. Hypercoagulable panel has been sent,. Cardiology has been follow the case as well and I appreciate there input.  Pending repeat MRA he may be a good candidate for Bralinta and /or ASA or plavix given his s

## 2018-07-25 ENCOUNTER — APPOINTMENT (OUTPATIENT)
Dept: CT IMAGING | Facility: HOSPITAL | Age: 58
DRG: 064 | End: 2018-07-25
Attending: Other
Payer: COMMERCIAL

## 2018-07-25 VITALS
SYSTOLIC BLOOD PRESSURE: 117 MMHG | WEIGHT: 197.38 LBS | HEART RATE: 86 BPM | OXYGEN SATURATION: 98 % | HEIGHT: 69 IN | BODY MASS INDEX: 29.23 KG/M2 | DIASTOLIC BLOOD PRESSURE: 78 MMHG | TEMPERATURE: 98 F | RESPIRATION RATE: 18 BRPM

## 2018-07-25 LAB
ANION GAP SERPL CALC-SCNC: 8 MMOL/L (ref 0–18)
BUN BLD-MCNC: 27 MG/DL (ref 8–20)
BUN/CREAT SERPL: 14.7 (ref 10–20)
CALCIUM BLD-MCNC: 8.9 MG/DL (ref 8.3–10.3)
CHLORIDE SERPL-SCNC: 104 MMOL/L (ref 101–111)
CO2 SERPL-SCNC: 26 MMOL/L (ref 22–32)
CREAT BLD-MCNC: 1.84 MG/DL (ref 0.7–1.3)
DRVVT LUPUS ANTICOAGULANT: NEGATIVE
DRVVT SCREEN RATIO: 0.84 (ref 0–1.29)
GLUCOSE BLD-MCNC: 122 MG/DL (ref 70–99)
MITOCHONDRIAL M2 AB, IGG: 13.6 UNITS
OSMOLALITY SERPL CALC.SUM OF ELEC: 292 MOSM/KG (ref 275–295)
POTASSIUM SERPL-SCNC: 4.2 MMOL/L (ref 3.6–5.1)
SODIUM SERPL-SCNC: 138 MMOL/L (ref 136–144)
STACLOT LA: POSITIVE
T4 FREE SERPL-MCNC: 1.3 NG/DL (ref 0.9–1.8)
TSI SER-ACNC: 8.37 MIU/ML (ref 0.35–5.5)

## 2018-07-25 PROCEDURE — 99239 HOSP IP/OBS DSCHRG MGMT >30: CPT | Performed by: HOSPITALIST

## 2018-07-25 PROCEDURE — 99232 SBSQ HOSP IP/OBS MODERATE 35: CPT | Performed by: OTHER

## 2018-07-25 PROCEDURE — 70496 CT ANGIOGRAPHY HEAD: CPT | Performed by: OTHER

## 2018-07-25 PROCEDURE — 70498 CT ANGIOGRAPHY NECK: CPT | Performed by: OTHER

## 2018-07-25 PROCEDURE — 99232 SBSQ HOSP IP/OBS MODERATE 35: CPT | Performed by: INTERNAL MEDICINE

## 2018-07-25 RX ORDER — OXYCODONE AND ACETAMINOPHEN 10; 325 MG/1; MG/1
1 TABLET ORAL EVERY 4 HOURS PRN
Status: DISCONTINUED | OUTPATIENT
Start: 2018-07-25 | End: 2018-07-25

## 2018-07-25 RX ORDER — ASPIRIN 81 MG/1
81 TABLET, CHEWABLE ORAL DAILY
Qty: 30 TABLET | Refills: 0 | Status: ON HOLD | OUTPATIENT
Start: 2018-07-26 | End: 2019-05-25

## 2018-07-25 RX ORDER — OXYCODONE AND ACETAMINOPHEN 7.5; 325 MG/1; MG/1
1 TABLET ORAL
Qty: 10 TABLET | Refills: 0 | Status: ON HOLD | OUTPATIENT
Start: 2018-07-25 | End: 2019-05-25

## 2018-07-25 RX ORDER — OXYCODONE AND ACETAMINOPHEN 10; 325 MG/1; MG/1
1 TABLET ORAL
Status: DISCONTINUED | OUTPATIENT
Start: 2018-07-25 | End: 2018-07-25

## 2018-07-25 RX ORDER — ONDANSETRON 4 MG/1
4 TABLET, FILM COATED ORAL EVERY 8 HOURS PRN
Qty: 20 TABLET | Refills: 0 | Status: ON HOLD | OUTPATIENT
Start: 2018-07-25 | End: 2019-05-25

## 2018-07-25 RX ORDER — MECLIZINE HYDROCHLORIDE 25 MG/1
25 TABLET ORAL 3 TIMES DAILY PRN
Qty: 20 TABLET | Refills: 0 | Status: ON HOLD | OUTPATIENT
Start: 2018-07-25 | End: 2019-05-25

## 2018-07-25 NOTE — PROGRESS NOTES
Neurology Progress Note    Kye Arroyo Patient Status:  Inpatient    10/1/1960 MRN [id number]   [facility] 8NE-A Attending Mariano Awad MD   Hosp Day # 1 PCP PHYSICIAN NONSTAFF     Subjective:  Pt was sen and examined in bed this am. distal V2, V3, and V4 segments of the left vertebral artery consistent with occlusion. These findings were also noted on the MRA from 7/22/2018. 2. Mild to moderate narrowing within the carotid bulbs.       Component      Latest Ref Rng & Units 7/24/2018 which also would require AC.  Rheumatology is following and I appreciate their recomendations    Plan:  1. L cerebellar stroke  - Repeat MRA showing L vertebral occlusion  - Will get CTA for further characterization of his L vertebral artery occlusion/disse

## 2018-07-25 NOTE — PROGRESS NOTES
[facility]  Rheumatology Progress Note  Harjeet Archer Patient Status:  Inpatient    10/1/1960 MRN [id number]   [facility] 8NE-A Attending Abhinav Lopez MD   Hosp Day # 1 PCP PHYSICIAN NONSTAFF     Subjective:  Harjeet Archer is a(n) 56 following his further problems which include a positive thyroglobulin in thyroperoxidase antibody so he has Hashimoto's thyroiditis. He also has a positive lupus anticoagulant test therefore he is prone to clotting.   Therefore he needs to be on anticoagul

## 2018-07-25 NOTE — PAYOR COMM NOTE
--------------  CONTINUED STAY REVIEW    Liana Tracey #:  [id number]  Authorization Number: N/A    Admit date: 7/24/18  Admit time: 1047    Admitting Physician: Zenon Loya MD  Attending Physician:  Mariano Awad MD  Primary Care y Oral Yakov Sahni RN    7/24/2018 2011 Given 100 mg Oral Ezra Pinedo RN      [facility]) 279.3 MG/ML injection 20 mL     Date Action Dose Route User    7/24/2018 2121 Given 18 mL Intravenous Adriana Hebert      Levothyroxine Sodium [facility] switch Plavix/ASA to Bralinta as strokes occurred while on ASA and Plavix. He may have also been on Eliquis in the past. This is unclear.    - He has been seen at multiple OSH for his strokes and dizziness over the past year  - Would benefit from PT/OT  - W

## 2018-07-25 NOTE — DIETARY NOTE
Nutrition Short Note    Dietitian consult received for low fatdiet education. Attempted education with patient, no family at bedside. Pt refused to reviewed foods not recommended or foods that are recommended.  Stated \"I just want a meal plan-I don't wan

## 2018-07-25 NOTE — CM/SW NOTE
Patient to be started on eliquis 5 mg bid--call placed to patient's listed pharmacy [facility]'s [phone]--$4.BR co-pay

## 2018-07-25 NOTE — PROGRESS NOTES
[facility]  Interventional Neuroradiology Progress Note    Sanjuanita Garcia Patient Status:  Inpatient    10/1/1960 MRN [id number]   [facility] 8NE-A Attending Arlene Navarro MD   Hosp Day # 1 PCP PHYSICIAN NONSTAFF     Subjective:  Rayshawn An moderate narrowing within the carotid bulbs. Assessment:  Left cerebellar stroke  Left vertebral occlusion vs dissection    Plan:  No endovascular intervention indicated.   Recommend CTA now as well as anticoagulation plus baby aspirin for at least thr

## 2018-07-25 NOTE — PLAN OF CARE
Assumed care of patient at 1100 from day RN. Pt sleeping in chair - received percocet this am.  Still c/o neck pain. Voids per urinal  Denies chest pain, sob. When getting up c/o dizziness, but resolved upon getting into bed.   A/o x3 forgetful at time

## 2018-07-25 NOTE — PROGRESS NOTES
[facility] HOSPITAL[facility]  Progress Note     Vern Rose Patient Status:  Observation    10/1/1960 MRN [id number]   [facility] 8NE-A Attending Saurabh Olmstead MD   Hosp Day # 1 PCP PHYSICIAN NONSTAFF     Chief Complaint: Chest pain    S: Patient 10 mg Oral Daily   • atorvastatin  80 mg Oral Nightly   • Clopidogrel Bisulfate  75 mg Oral Daily   • Levothyroxine Sodium  200 mcg Oral Before breakfast   • Losartan Potassium  50 mg Oral Daily   • aspirin  81 mg Oral Daily     Cath 11/2/2017    --  Julien Shepard medical therapy. Angina/MI: unstable angina, CCS class IV. HISTORY: There was no prior diagnosis of congestive heart failure. The patient has hypertension, a history of current  cigarette use, and a family history of coronary artery disease.  There was n 30%stenosis.  Mid-vessel   lesion: There is a diffuse, 5% in-stent restenosis in previous sten  t #1.   Left circumflex: Prior intervention: stent in the proximal left  circumflex.  Ostial lesion: There is a discrete, 30%stenosis.  Proxi  mal vessel lesion:

## 2018-07-25 NOTE — CM/SW NOTE
Spoke with erika in admissions at [facility] [address], [address] facility of need for another mra either to be done today/tomorrow, bed still available

## 2018-07-26 LAB
AT3 ACTIVITY: 114 % (ref 80–120)
COMPLEMENT ACTIVITY, TOTAL EIA: 88 CAE UNITS
PROTEIN C ACTIVITY: >150 % (ref 70–130)
PROTEIN S ACTIVITY: 87 % (ref 65–140)

## 2018-07-26 NOTE — DISCHARGE SUMMARY
[facility]PITAL[facility]  DISCHARGE SUMMARY     Renaye Schwab Patient Status:  Inpatient    10/1/1960 MRN [id number]   [facility] 8NE-A Attending No att. providers found   Hosp Day # 1 PCP PHYSICIAN NONSTAFF     Date of Admission: 2018  Date standpoint with repeat MRI studies, which revealed subacute left cerebellar infarct. 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 Unclear if he had been compliant with eliquis or any of his DAPT.   Cytochrome testing of plavix show tablet  Refills:  0        CONTINUE taking these medications      Instructions Prescription details   AmLODIPine Besylate 10 MG Tabs  Commonly known as:  NORVASC      Take 10 mg by mouth daily.    Refills:  0     atorvastatin 80 MG Tabs  Commonly known as: rebound or guarding. Extremities: No edema.   Left hand is weak and clumsy  -----------------------------------------------------------------------------------------------  PATIENT DISCHARGE INSTRUCTIONS: See electronic chart    Billy Gibbons MD 7/26/2018

## 2018-07-27 NOTE — PAYOR COMM NOTE
--------------  DISCHARGE REVIEW    Cristal Cueto #:  [id number]  Authorization Number: N/A    Admit date: 7/24/18  Admit time:  6802  Discharge Date: 7/25/2018  8:28 PM     Admitting Physician: Stephanie Arce MD  Attending Physician:  Aleta

## 2018-08-23 ENCOUNTER — HOSPITAL ENCOUNTER (EMERGENCY)
Facility: HOSPITAL | Age: 58
Discharge: HOME | End: 2018-08-23
Attending: EMERGENCY MEDICINE
Payer: COMMERCIAL

## 2018-08-23 ENCOUNTER — APPOINTMENT (OUTPATIENT)
Dept: GENERAL RADIOLOGY | Facility: HOSPITAL | Age: 58
End: 2018-08-23
Attending: EMERGENCY MEDICINE
Payer: COMMERCIAL

## 2018-08-23 ENCOUNTER — APPOINTMENT (OUTPATIENT)
Dept: CT IMAGING | Facility: HOSPITAL | Age: 58
End: 2018-08-23
Attending: EMERGENCY MEDICINE
Payer: COMMERCIAL

## 2018-08-23 VITALS
HEIGHT: 69 IN | HEART RATE: 87 BPM | SYSTOLIC BLOOD PRESSURE: 117 MMHG | DIASTOLIC BLOOD PRESSURE: 90 MMHG | OXYGEN SATURATION: 99 % | WEIGHT: 200 LBS | TEMPERATURE: 98 F | RESPIRATION RATE: 18 BRPM | BODY MASS INDEX: 29.62 KG/M2

## 2018-08-23 DIAGNOSIS — S16.1XXA STRAIN OF NECK MUSCLE, INITIAL ENCOUNTER: ICD-10-CM

## 2018-08-23 DIAGNOSIS — M25.552 PAIN OF BOTH HIP JOINTS: Primary | ICD-10-CM

## 2018-08-23 DIAGNOSIS — M25.551 PAIN OF BOTH HIP JOINTS: Primary | ICD-10-CM

## 2018-08-23 DIAGNOSIS — R07.9 CHEST PAIN OF UNCERTAIN ETIOLOGY: ICD-10-CM

## 2018-08-23 LAB
ANION GAP SERPL CALC-SCNC: 8 MMOL/L (ref 0–18)
BASOPHILS # BLD: 0.1 K/UL (ref 0–0.2)
BASOPHILS NFR BLD: 1 %
BUN SERPL-MCNC: 15 MG/DL (ref 8–20)
BUN/CREAT SERPL: 11.6 (ref 10–20)
CALCIUM SERPL-MCNC: 9.1 MG/DL (ref 8.5–10.5)
CHLORIDE SERPL-SCNC: 102 MMOL/L (ref 95–110)
CO2 SERPL-SCNC: 25 MMOL/L (ref 22–32)
CREAT SERPL-MCNC: 1.29 MG/DL (ref 0.5–1.5)
D DIMER PPP FEU-MCNC: 0.99 MCG/ML (ref ?–0.55)
EOSINOPHIL # BLD: 0.4 K/UL (ref 0–0.7)
EOSINOPHIL NFR BLD: 5 %
ERYTHROCYTE [DISTWIDTH] IN BLOOD BY AUTOMATED COUNT: 13.7 % (ref 11–15)
GLUCOSE SERPL-MCNC: 137 MG/DL (ref 70–99)
HCT VFR BLD AUTO: 34.8 % (ref 41–52)
HGB BLD-MCNC: 11.8 G/DL (ref 13.5–17.5)
LYMPHOCYTES # BLD: 1.3 K/UL (ref 1–4)
LYMPHOCYTES NFR BLD: 15 %
MCH RBC QN AUTO: 28.9 PG (ref 27–32)
MCHC RBC AUTO-ENTMCNC: 34 G/DL (ref 32–37)
MCV RBC AUTO: 85.1 FL (ref 80–100)
MONOCYTES # BLD: 0.6 K/UL (ref 0–1)
MONOCYTES NFR BLD: 7 %
NEUTROPHILS # BLD AUTO: 6.2 K/UL (ref 1.8–7.7)
NEUTROPHILS NFR BLD: 72 %
OSMOLALITY UR CALC.SUM OF ELEC: 283 MOSM/KG (ref 275–295)
PLATELET # BLD AUTO: 233 K/UL (ref 140–400)
PMV BLD AUTO: 8.1 FL (ref 7.4–10.3)
POTASSIUM SERPL-SCNC: 3.6 MMOL/L (ref 3.3–5.1)
RBC # BLD AUTO: 4.08 M/UL (ref 4.5–5.9)
SODIUM SERPL-SCNC: 135 MMOL/L (ref 136–144)
TROPONIN I SERPL-MCNC: 0.01 NG/ML (ref ?–0.03)
TROPONIN I SERPL-MCNC: 0.01 NG/ML (ref ?–0.03)
WBC # BLD AUTO: 8.6 K/UL (ref 4–11)

## 2018-08-23 PROCEDURE — 93010 ELECTROCARDIOGRAM REPORT: CPT | Performed by: EMERGENCY MEDICINE

## 2018-08-23 PROCEDURE — 96376 TX/PRO/DX INJ SAME DRUG ADON: CPT

## 2018-08-23 PROCEDURE — 96374 THER/PROPH/DIAG INJ IV PUSH: CPT

## 2018-08-23 PROCEDURE — 80048 BASIC METABOLIC PNL TOTAL CA: CPT | Performed by: EMERGENCY MEDICINE

## 2018-08-23 PROCEDURE — 84484 ASSAY OF TROPONIN QUANT: CPT | Performed by: EMERGENCY MEDICINE

## 2018-08-23 PROCEDURE — 71045 X-RAY EXAM CHEST 1 VIEW: CPT | Performed by: EMERGENCY MEDICINE

## 2018-08-23 PROCEDURE — 85379 FIBRIN DEGRADATION QUANT: CPT | Performed by: EMERGENCY MEDICINE

## 2018-08-23 PROCEDURE — 71275 CT ANGIOGRAPHY CHEST: CPT | Performed by: EMERGENCY MEDICINE

## 2018-08-23 PROCEDURE — 74175 CTA ABDOMEN W/CONTRAST: CPT | Performed by: EMERGENCY MEDICINE

## 2018-08-23 PROCEDURE — 99285 EMERGENCY DEPT VISIT HI MDM: CPT

## 2018-08-23 PROCEDURE — 93005 ELECTROCARDIOGRAM TRACING: CPT

## 2018-08-23 PROCEDURE — 85025 COMPLETE CBC W/AUTO DIFF WBC: CPT | Performed by: EMERGENCY MEDICINE

## 2018-08-23 RX ORDER — PREDNISONE 20 MG/1
40 TABLET ORAL DAILY
Qty: 6 TABLET | Refills: 0 | Status: SHIPPED | OUTPATIENT
Start: 2018-08-23 | End: 2018-08-26

## 2018-08-23 RX ORDER — MORPHINE SULFATE 4 MG/ML
2 INJECTION, SOLUTION INTRAMUSCULAR; INTRAVENOUS ONCE
Status: COMPLETED | OUTPATIENT
Start: 2018-08-23 | End: 2018-08-23

## 2018-08-23 RX ORDER — MORPHINE SULFATE 4 MG/ML
4 INJECTION, SOLUTION INTRAMUSCULAR; INTRAVENOUS ONCE
Status: COMPLETED | OUTPATIENT
Start: 2018-08-23 | End: 2018-08-23

## 2018-08-23 RX ORDER — KETOROLAC TROMETHAMINE 30 MG/ML
30 INJECTION, SOLUTION INTRAMUSCULAR; INTRAVENOUS ONCE
Status: DISCONTINUED | OUTPATIENT
Start: 2018-08-23 | End: 2018-08-23

## 2018-08-23 RX ORDER — TRAMADOL HYDROCHLORIDE 50 MG/1
50 TABLET ORAL EVERY 8 HOURS PRN
Qty: 14 TABLET | Refills: 0 | Status: SHIPPED | OUTPATIENT
Start: 2018-08-23 | End: 2018-08-23

## 2018-08-23 NOTE — ED INITIAL ASSESSMENT (HPI)
Pt reports CP since waking this AM. Also reports hip pain, \" like someone is prying them out of my body\", back pain and abd pain as well

## 2018-08-23 NOTE — CM/SW NOTE
Patient requested ride home, contacted [facility] discharge cab for p/u, ETA 1-3hrs per Gael Escamilla dispatcher. Nurse, doctor, and pt informed of this, pt to ER lobby waiting area. This writer will recheck in one hour of status update on cab.

## 2018-08-23 NOTE — ED NOTES
Pt alert and interactive. Complains of chest pain/tightness that woke him up from sleep, also complains of dizziness that is worse with movement. Toke 3 oxycodone 10mg today for the pain and was not successful.  Also reports Neck, back, and hip pain that is

## 2018-08-23 NOTE — CM/SW NOTE
Returned call from transportation ref# [id number] awaiting for pt pickup. This writer went outside and found pt getting into [facility] he contacted himself. Pt was explained his insurance ride is ready to pick him up and stated to cancel it.   Pt drove off

## 2018-08-24 NOTE — ED PROVIDER NOTES
Patient Seen in: [facility] AND [facility] Emergency Department    History   Patient presents with:  Chest Pain Angina (cardiovascular)  Lower Extremity Injury (musculoskeletal)  Abdomen/Flank Pain (GI/)  Back Pain (musculoskeletal)    Stated Complaint: CP, hi times daily as needed for Dizziness. Ondansetron HCl (ZOFRAN) 4 mg tablet,  Take 1 tablet (4 mg total) by mouth every 8 (eight) hours as needed. aspirin 81 MG Oral Chew Tab,  Chew 1 tablet (81 mg total) by mouth daily.    apixaban 5 MG Oral Tab,  Take 1 99%   BMI 29.53 kg/m²    PULSE OX Nl on room air    GENERAL: Patient appears tired though nontoxic  HEAD: normocephalic, atraumatic,   EYES: PERRLA, EOMI, conj sclera clear  THROAT: mmm, no lesions  NECK: supple, no meningeal signs  LUNGS: no resp distress EKG Report. Rate: 88  Rhythm: Sinus Rhythm  Reading: There is some nonspecific changes left anterior fascicular block no significant change from previous EKG.            [facility]       Cardiac Monitor: Pulse Readings from Last 1 Encounters:  08/23/18 : 87  , si renal cyst. 10. Lesser incidental findings as above.      Dictated by (CST): Todd Infante MD on 8/23/2018 at 15:02     Approved by (CST): Todd Infante MD on 8/23/2018 at 15:17              MDM               Disposition and Plan     Clinical Impression

## 2018-09-12 ENCOUNTER — APPOINTMENT (OUTPATIENT)
Dept: GENERAL RADIOLOGY | Facility: HOSPITAL | Age: 58
End: 2018-09-12
Payer: COMMERCIAL

## 2018-09-12 ENCOUNTER — HOSPITAL ENCOUNTER (EMERGENCY)
Facility: HOSPITAL | Age: 58
Discharge: HOME | End: 2018-09-12
Attending: EMERGENCY MEDICINE
Payer: COMMERCIAL

## 2018-09-12 VITALS
HEIGHT: 68 IN | BODY MASS INDEX: 30.31 KG/M2 | TEMPERATURE: 98 F | RESPIRATION RATE: 16 BRPM | OXYGEN SATURATION: 99 % | WEIGHT: 200 LBS | HEART RATE: 66 BPM | SYSTOLIC BLOOD PRESSURE: 117 MMHG | DIASTOLIC BLOOD PRESSURE: 84 MMHG

## 2018-09-12 DIAGNOSIS — R07.9 CHEST PAIN OF UNCERTAIN ETIOLOGY: Primary | ICD-10-CM

## 2018-09-12 LAB
ANION GAP SERPL CALC-SCNC: 9 MMOL/L (ref 0–18)
BASOPHILS # BLD: 0.1 K/UL (ref 0–0.2)
BASOPHILS NFR BLD: 2 %
BUN SERPL-MCNC: 24 MG/DL (ref 8–20)
BUN/CREAT SERPL: 20.7 (ref 10–20)
CALCIUM SERPL-MCNC: 9.4 MG/DL (ref 8.5–10.5)
CHLORIDE SERPL-SCNC: 108 MMOL/L (ref 95–110)
CO2 SERPL-SCNC: 19 MMOL/L (ref 22–32)
CREAT SERPL-MCNC: 1.16 MG/DL (ref 0.5–1.5)
EOSINOPHIL # BLD: 0.5 K/UL (ref 0–0.7)
EOSINOPHIL NFR BLD: 7 %
ERYTHROCYTE [DISTWIDTH] IN BLOOD BY AUTOMATED COUNT: 14.8 % (ref 11–15)
GLUCOSE SERPL-MCNC: 129 MG/DL (ref 70–99)
HCT VFR BLD AUTO: 36.2 % (ref 41–52)
HGB BLD-MCNC: 12.3 G/DL (ref 13.5–17.5)
INR BLD: 0.9 (ref 0.9–1.2)
LYMPHOCYTES # BLD: 1.5 K/UL (ref 1–4)
LYMPHOCYTES NFR BLD: 19 %
MCH RBC QN AUTO: 28.5 PG (ref 27–32)
MCHC RBC AUTO-ENTMCNC: 34 G/DL (ref 32–37)
MCV RBC AUTO: 83.7 FL (ref 80–100)
MONOCYTES # BLD: 0.4 K/UL (ref 0–1)
MONOCYTES NFR BLD: 5 %
NEUTROPHILS # BLD AUTO: 5.6 K/UL (ref 1.8–7.7)
NEUTROPHILS NFR BLD: 68 %
OSMOLALITY UR CALC.SUM OF ELEC: 288 MOSM/KG (ref 275–295)
PLATELET # BLD AUTO: 278 K/UL (ref 140–400)
PMV BLD AUTO: 8.4 FL (ref 7.4–10.3)
POTASSIUM SERPL-SCNC: 3.8 MMOL/L (ref 3.3–5.1)
PROTHROMBIN TIME: 12.3 SECONDS (ref 11.8–14.5)
RBC # BLD AUTO: 4.33 M/UL (ref 4.5–5.9)
SODIUM SERPL-SCNC: 136 MMOL/L (ref 136–144)
TROPONIN I SERPL-MCNC: 0.01 NG/ML (ref ?–0.03)
TROPONIN I SERPL-MCNC: 0.01 NG/ML (ref ?–0.03)
WBC # BLD AUTO: 8.2 K/UL (ref 4–11)

## 2018-09-12 PROCEDURE — 85025 COMPLETE CBC W/AUTO DIFF WBC: CPT | Performed by: EMERGENCY MEDICINE

## 2018-09-12 PROCEDURE — 84484 ASSAY OF TROPONIN QUANT: CPT | Performed by: EMERGENCY MEDICINE

## 2018-09-12 PROCEDURE — 93010 ELECTROCARDIOGRAM REPORT: CPT | Performed by: EMERGENCY MEDICINE

## 2018-09-12 PROCEDURE — 71046 X-RAY EXAM CHEST 2 VIEWS: CPT | Performed by: EMERGENCY MEDICINE

## 2018-09-12 PROCEDURE — 36415 COLL VENOUS BLD VENIPUNCTURE: CPT

## 2018-09-12 PROCEDURE — 99285 EMERGENCY DEPT VISIT HI MDM: CPT

## 2018-09-12 PROCEDURE — 80048 BASIC METABOLIC PNL TOTAL CA: CPT | Performed by: EMERGENCY MEDICINE

## 2018-09-12 PROCEDURE — 85610 PROTHROMBIN TIME: CPT | Performed by: EMERGENCY MEDICINE

## 2018-09-12 PROCEDURE — 93005 ELECTROCARDIOGRAM TRACING: CPT

## 2018-09-12 RX ORDER — ACETAMINOPHEN 500 MG
TABLET ORAL
Status: COMPLETED
Start: 2018-09-12 | End: 2018-09-12

## 2018-09-12 RX ORDER — ACETAMINOPHEN 500 MG
1000 TABLET ORAL ONCE
Status: COMPLETED | OUTPATIENT
Start: 2018-09-12 | End: 2018-09-12

## 2018-09-12 NOTE — ED INITIAL ASSESSMENT (HPI)
ARRESTED AND IN POLICE CUSTODY AND WAS C/O GENERALIZED CHEST PAIN 5/10 WITHOUT ANY OTHER SYMPTOMS
no

## 2018-09-12 NOTE — ED PROVIDER NOTES
Patient Seen in: [facility] AND [facility] Emergency Department    History   Patient presents with:  Chest Pain Angina (cardiovascular)    Stated Complaint: CHEST PAIN    HPI    History is provided by EMS and patient.     53-year-old male with history of CAD, pre use: Yes      Comment: \"occasionally\"    Drug use: No      Review of Systems   Constitutional: Negative for appetite change, fatigue and fever. HENT: Negative for congestion, ear pain and sore throat. Eyes: Negative for pain, discharge and redness. tenderness. Neurological: He is alert and oriented to person, place, and time.    5/5 strength in b/l UEs and LEs, normal sensation in all extremities, normal finger to nose b/l, normal gait, no facial asymmetry, normal speech     Skin: Skin is warm and d BUN/CREA Ratio 20.7 (H) 10.0 - 20.0    Anion Gap 9 0 - 18 mmol/L    Calculated Osmolality 288 275 - 295 mOsm/kg    GFR, Non- >60 >=60    GFR, -American >60 >=60   RAINBOW DRAW LAVENDER    Collection Time: 09/12/18 12:35 PM   Resul ordered and personally reviewed the labs and imaging and found CXR without acute finding, troponin negative, no leukocytosis, mild anemia, electrolytes reassuring, INR subtherapeutic  - repeat troponin negative    HEART Score for cardiac disease  SHANNAN Frye understanding and agrees to d/c instructions    EMERGENCY DEPARTMENT MEDICAL DECISION MAKING:  After obtaining the patient's history, performing the physical exam and reviewing the diagnostics, multiple initial diagnoses were considered based on the presen

## 2019-05-25 ENCOUNTER — HOSPITAL ENCOUNTER (INPATIENT)
Facility: HOSPITAL | Age: 59
LOS: 7 days | Discharge: HOME | DRG: 247 | End: 2019-06-01
Attending: EMERGENCY MEDICINE | Admitting: HOSPITALIST
Payer: MEDICAID

## 2019-05-25 ENCOUNTER — APPOINTMENT (OUTPATIENT)
Dept: CT IMAGING | Facility: HOSPITAL | Age: 59
DRG: 247 | End: 2019-05-25
Attending: EMERGENCY MEDICINE
Payer: MEDICAID

## 2019-05-25 DIAGNOSIS — I21.4 NSTEMI (NON-ST ELEVATED MYOCARDIAL INFARCTION) (HCC): Primary | ICD-10-CM

## 2019-05-25 PROCEDURE — 71260 CT THORAX DX C+: CPT | Performed by: EMERGENCY MEDICINE

## 2019-05-25 RX ORDER — ONDANSETRON 2 MG/ML
4 INJECTION INTRAMUSCULAR; INTRAVENOUS EVERY 6 HOURS PRN
Status: DISCONTINUED | OUTPATIENT
Start: 2019-05-25 | End: 2019-06-01

## 2019-05-25 RX ORDER — METOPROLOL SUCCINATE 100 MG/1
100 TABLET, EXTENDED RELEASE ORAL DAILY
Status: ON HOLD | COMMUNITY
End: 2020-06-22

## 2019-05-25 RX ORDER — MORPHINE SULFATE 4 MG/ML
4 INJECTION, SOLUTION INTRAMUSCULAR; INTRAVENOUS ONCE
Status: COMPLETED | OUTPATIENT
Start: 2019-05-25 | End: 2019-05-25

## 2019-05-25 RX ORDER — SODIUM CHLORIDE 0.9 % (FLUSH) 0.9 %
3 SYRINGE (ML) INJECTION AS NEEDED
Status: DISCONTINUED | OUTPATIENT
Start: 2019-05-25 | End: 2019-06-01

## 2019-05-25 RX ORDER — POTASSIUM CHLORIDE 20 MEQ/1
40 TABLET, EXTENDED RELEASE ORAL ONCE
Status: COMPLETED | OUTPATIENT
Start: 2019-05-25 | End: 2019-05-26

## 2019-05-25 RX ORDER — ASPIRIN 81 MG/1
324 TABLET, CHEWABLE ORAL ONCE
Status: COMPLETED | OUTPATIENT
Start: 2019-05-25 | End: 2019-05-25

## 2019-05-25 RX ORDER — FLUOXETINE HYDROCHLORIDE 20 MG/1
20 CAPSULE ORAL DAILY
COMMUNITY

## 2019-05-25 RX ORDER — FLUOXETINE HYDROCHLORIDE 20 MG/1
20 CAPSULE ORAL DAILY
Status: DISCONTINUED | OUTPATIENT
Start: 2019-05-26 | End: 2019-06-01

## 2019-05-25 RX ORDER — NITROGLYCERIN 0.4 MG/1
0.4 TABLET SUBLINGUAL EVERY 5 MIN PRN
Status: DISCONTINUED | OUTPATIENT
Start: 2019-05-25 | End: 2019-06-01

## 2019-05-25 RX ORDER — NITROGLYCERIN 0.4 MG/1
0.4 TABLET SUBLINGUAL EVERY 5 MIN PRN
Status: DISCONTINUED | OUTPATIENT
Start: 2019-05-25 | End: 2019-05-25

## 2019-05-25 RX ORDER — ASPIRIN 325 MG
325 TABLET ORAL DAILY
Status: DISCONTINUED | OUTPATIENT
Start: 2019-05-25 | End: 2019-05-29

## 2019-05-25 RX ORDER — MEMANTINE HYDROCHLORIDE 5 MG/1
5 TABLET ORAL DAILY
COMMUNITY

## 2019-05-25 RX ORDER — MEMANTINE HYDROCHLORIDE 5 MG/1
5 TABLET ORAL DAILY
Status: DISCONTINUED | OUTPATIENT
Start: 2019-05-26 | End: 2019-06-01

## 2019-05-25 RX ORDER — HYDROCODONE BITARTRATE AND ACETAMINOPHEN 7.5; 325 MG/1; MG/1
1 TABLET ORAL EVERY 6 HOURS PRN
Status: DISCONTINUED | OUTPATIENT
Start: 2019-05-25 | End: 2019-05-26

## 2019-05-25 RX ORDER — HEPARIN SODIUM 1000 [USP'U]/ML
5000 INJECTION, SOLUTION INTRAVENOUS; SUBCUTANEOUS ONCE
Status: DISCONTINUED | OUTPATIENT
Start: 2019-05-25 | End: 2019-05-25

## 2019-05-25 RX ORDER — LEVOTHYROXINE SODIUM 0.1 MG/1
200 TABLET ORAL
Status: DISCONTINUED | OUTPATIENT
Start: 2019-05-26 | End: 2019-06-01

## 2019-05-25 RX ORDER — KETOROLAC TROMETHAMINE 30 MG/ML
30 INJECTION, SOLUTION INTRAMUSCULAR; INTRAVENOUS ONCE
Status: COMPLETED | OUTPATIENT
Start: 2019-05-25 | End: 2019-05-25

## 2019-05-25 RX ORDER — METOCLOPRAMIDE HYDROCHLORIDE 5 MG/ML
10 INJECTION INTRAMUSCULAR; INTRAVENOUS EVERY 8 HOURS PRN
Status: DISCONTINUED | OUTPATIENT
Start: 2019-05-25 | End: 2019-06-01

## 2019-05-25 RX ORDER — HEPARIN SODIUM AND DEXTROSE 10000; 5 [USP'U]/100ML; G/100ML
1000 INJECTION INTRAVENOUS ONCE
Status: COMPLETED | OUTPATIENT
Start: 2019-05-25 | End: 2019-05-26

## 2019-05-25 RX ORDER — METOPROLOL SUCCINATE 100 MG/1
100 TABLET, EXTENDED RELEASE ORAL DAILY
Status: DISCONTINUED | OUTPATIENT
Start: 2019-05-26 | End: 2019-06-01

## 2019-05-25 RX ORDER — HYDROCODONE BITARTRATE AND ACETAMINOPHEN 10; 325 MG/1; MG/1
1 TABLET ORAL EVERY 4 HOURS PRN
Status: DISCONTINUED | OUTPATIENT
Start: 2019-05-25 | End: 2019-05-25

## 2019-05-25 RX ORDER — ALFUZOSIN HYDROCHLORIDE 10 MG/1
10 TABLET, EXTENDED RELEASE ORAL
Status: DISCONTINUED | OUTPATIENT
Start: 2019-05-26 | End: 2019-06-01

## 2019-05-25 RX ORDER — HEPARIN SODIUM AND DEXTROSE 10000; 5 [USP'U]/100ML; G/100ML
1000 INJECTION INTRAVENOUS ONCE
Status: DISCONTINUED | OUTPATIENT
Start: 2019-05-25 | End: 2019-05-25

## 2019-05-25 RX ORDER — HEPARIN SODIUM 1000 [USP'U]/ML
5000 INJECTION, SOLUTION INTRAVENOUS; SUBCUTANEOUS ONCE
Status: COMPLETED | OUTPATIENT
Start: 2019-05-25 | End: 2019-05-25

## 2019-05-25 RX ORDER — HEPARIN SODIUM AND DEXTROSE 10000; 5 [USP'U]/100ML; G/100ML
INJECTION INTRAVENOUS CONTINUOUS
Status: DISCONTINUED | OUTPATIENT
Start: 2019-05-26 | End: 2019-05-26

## 2019-05-25 RX ORDER — TAMSULOSIN HYDROCHLORIDE 0.4 MG/1
0.4 CAPSULE ORAL DAILY
Status: ON HOLD | COMMUNITY
End: 2019-06-01

## 2019-05-26 ENCOUNTER — APPOINTMENT (OUTPATIENT)
Dept: CV DIAGNOSTICS | Facility: HOSPITAL | Age: 59
DRG: 247 | End: 2019-05-26
Attending: EMERGENCY MEDICINE
Payer: MEDICAID

## 2019-05-26 PROCEDURE — 99223 1ST HOSP IP/OBS HIGH 75: CPT | Performed by: HOSPITALIST

## 2019-05-26 PROCEDURE — 93306 TTE W/DOPPLER COMPLETE: CPT | Performed by: EMERGENCY MEDICINE

## 2019-05-26 RX ORDER — MORPHINE SULFATE 2 MG/ML
2 INJECTION, SOLUTION INTRAMUSCULAR; INTRAVENOUS EVERY 4 HOURS PRN
Status: DISCONTINUED | OUTPATIENT
Start: 2019-05-26 | End: 2019-05-26

## 2019-05-26 RX ORDER — MORPHINE SULFATE 4 MG/ML
4 INJECTION, SOLUTION INTRAMUSCULAR; INTRAVENOUS EVERY 4 HOURS PRN
Status: DISCONTINUED | OUTPATIENT
Start: 2019-05-26 | End: 2019-05-30

## 2019-05-26 RX ORDER — ATORVASTATIN CALCIUM 40 MG/1
40 TABLET, FILM COATED ORAL NIGHTLY
Status: DISCONTINUED | OUTPATIENT
Start: 2019-05-26 | End: 2019-06-01

## 2019-05-26 RX ORDER — MORPHINE SULFATE 2 MG/ML
2 INJECTION, SOLUTION INTRAMUSCULAR; INTRAVENOUS EVERY 4 HOURS PRN
Status: DISCONTINUED | OUTPATIENT
Start: 2019-05-26 | End: 2019-05-30

## 2019-05-26 RX ORDER — HEPARIN SODIUM AND DEXTROSE 10000; 5 [USP'U]/100ML; G/100ML
INJECTION INTRAVENOUS CONTINUOUS
Status: DISCONTINUED | OUTPATIENT
Start: 2019-05-26 | End: 2019-05-30

## 2019-05-26 RX ORDER — TRAMADOL HYDROCHLORIDE 50 MG/1
50 TABLET ORAL EVERY 6 HOURS PRN
Status: DISCONTINUED | OUTPATIENT
Start: 2019-05-26 | End: 2019-05-30

## 2019-05-26 NOTE — ED NOTES
Pt states that he no longer has cp, but that the pain in his hips is unimproved. ERMD notified, no new orders.

## 2019-05-26 NOTE — ED NOTES
Pt arrived with c/o chest pain, back pain, & left arm pain for 2 days and intermittent headaches. Pt denies sob, dizziness/lightheadedness, visual changes, n/v. pts work of breathing is easy and unlabored, skin color is appropriate.  Will continue to [facility]

## 2019-05-26 NOTE — H&P
[address] [place] [address]  : 10/1/1960    Status: Inpatient  Day #: 1    Attending: Iesha Dorantes MD  PCP: PHYSICIAN NONSTAFF     Date of Encounter:  2019  Date of Admission:  2019     Chief Complaint: chest p tobacco: Never Used      Tobacco comment: 4 cigarettes a day    Alcohol use: Yes      Comment: \"occasionally\"    Drug use: No      Allergies:   Penicillins             HIVES, SHORTNESS OF BREATH    Medications :   Memantine HCl 5 MG Oral Tab,  Take 5 mg   --   --  139  --    K 3.8  --   --  4.3  --      --   --  108  --    CO2 23.0  --   --  20.0*  --    *  --   --  105*  --    PTT 28.9  --  95.6*  --  55.1*   TROP 0.404* 0.470*  --  0.372*  --      Ct Chest Pain/pe (iv Only) (cpt=712

## 2019-05-26 NOTE — ED NOTES
Pt states that cp is still present but improved. 2nd dose of nitro sl administered. Pt also c/o increased pain to his rt hip and neck.  ERMD notified

## 2019-05-26 NOTE — ED PROVIDER NOTES
Patient Seen in: [facility] AND [facility] Emergency Department    History   Patient presents with:  Chest Pain    Stated Complaint:     HPI    72-year-old hypertension, hyperlipidemia, here with complaints of chest pain, back pain, right hip pain male with hist sore throat. Eyes: Negative for pain, discharge and redness. Respiratory: Negative for cough, shortness of breath and wheezing. Cardiovascular: Positive for chest pain. Gastrointestinal: Negative for abdominal pain, diarrhea, nausea and vomiting. unremarkable    No spinal tenderness, pelvis stable   Neurological: He is alert and oriented to person, place, and time.    5/5 strength in b/l UEs and LEs, normal sensation in all extremities, normal finger to nose b/l, normal gait, no facial asymmetry, no GFR, Non- 47 (L) >=60    GFR, -American 55 (L) >=60   TROPONIN I    Collection Time: 05/25/19  7:16 PM   Result Value Ref Range    Troponin 0.404 (HH) <0.045 ng/mL   CBC W/ DIFFERENTIAL    Collection Time: 05/25/19  7:16 PM   Resu Mckenna Longo M.D. This report has been electronically signed and verified by the Radiologist whose name is printed above.     DD:  05/25/2019/DT:  05/25/2019         EMERGENCY DEPARTMENT COURSE AND TREATMENT:  Patient's condition was stable during Emergency Dep MAKING:  After obtaining the patient's history, performing the physical exam and reviewing the diagnostics, multiple initial diagnoses were considered based on the presenting problem including NSTEMI, dissection, PE, pneumonia, anxiety, drug seeking.     Cr

## 2019-05-26 NOTE — CONSULTS
[facility]  Cardiology Consultation    Sanjuanita Garcia Patient Status:  Emergency    10/1/1960 MRN [id number]   Location [address] Attending Deisi Henriquez MD   Hosp Day # 0 PCP PHYSICIAN NONSTAFF     Reason for [facility], [facility] Hypertension Paternal Uncle    • Lipids Paternal Uncle    • Cancer Paternal Uncle       reports that he has been smoking cigarettes. He has a 20.00 pack-year smoking history. He has never used smokeless tobacco. He reports that he drinks alcohol.  He repor HGB 14.1 05/25/2019    HCT 40.9 05/25/2019    .0 05/25/2019    CREATSERUM 1.60 05/25/2019    BUN 24 05/25/2019     05/25/2019    K 3.8 05/25/2019     05/25/2019    CO2 23.0 05/25/2019     05/25/2019    CA 9.1 05/25/2019    PTT

## 2019-05-26 NOTE — PLAN OF CARE
Problem: Patient Centered Care  Goal: Patient preferences are identified and integrated in the patient's plan of care  Description  Interventions:  - What would you like us to know as we care for you?  I am in pain  - Provide timely, complete, and accurat Angina  - Evaluate fluid balance, assess for edema, trend weights  5/26/2019 1458 by Eulalio Kam RN  Outcome: Progressing  5/26/2019 1456 by Eulalio Kam RN  Outcome: Progressing  Goal: Absence of cardiac arrhythmias or at baseline  Description  INT

## 2019-05-26 NOTE — PROGRESS NOTES
[facility]  Progress Note    Mela Bynum Patient Status:  Inpatient    10/1/1960 MRN [id number]   Location [facility] 3W/SW Attending George Galicia MD   Hosp Day # 1 PCP PHYSICIAN NONSTAFF     Assessment:  1. NSTEMI  2.   CAD s/p PCI 2 years a LDL  05/25/2019      Comment:      Unable to calculate LDL due to Triglycerides >400.0 mg/dL      Desirable <100 mg/dL   Borderline 100-129 mg/dL   High     >=130mg/dL        HDL 21 05/25/2019    TRIG 783 05/25/2019    VLDL  05/25/2019      Comment:

## 2019-05-27 ENCOUNTER — APPOINTMENT (OUTPATIENT)
Dept: ULTRASOUND IMAGING | Facility: HOSPITAL | Age: 59
DRG: 247 | End: 2019-05-27
Attending: INTERNAL MEDICINE
Payer: MEDICAID

## 2019-05-27 PROCEDURE — 99233 SBSQ HOSP IP/OBS HIGH 50: CPT | Performed by: HOSPITALIST

## 2019-05-27 PROCEDURE — 76775 US EXAM ABDO BACK WALL LIM: CPT | Performed by: INTERNAL MEDICINE

## 2019-05-27 RX ORDER — GABAPENTIN 100 MG/1
100 CAPSULE ORAL 3 TIMES DAILY
Status: DISCONTINUED | OUTPATIENT
Start: 2019-05-27 | End: 2019-06-01

## 2019-05-27 RX ORDER — SODIUM CHLORIDE 9 MG/ML
INJECTION, SOLUTION INTRAVENOUS CONTINUOUS
Status: DISCONTINUED | OUTPATIENT
Start: 2019-05-27 | End: 2019-05-30

## 2019-05-27 RX ORDER — FINASTERIDE 5 MG/1
5 TABLET, FILM COATED ORAL DAILY
Status: DISCONTINUED | OUTPATIENT
Start: 2019-05-27 | End: 2019-06-01

## 2019-05-27 NOTE — PLAN OF CARE
Problem: Patient Centered Care  Goal: Patient preferences are identified and integrated in the patient's plan of care  Description  Interventions:  - What would you like us to know as we care for you?  I am in pain  - Provide timely, complete, and accurat and heart rate control medications as ordered  - Initiate emergency measures for life threatening arrhythmias  - Monitor electrolytes and administer replacement therapy as ordered  Outcome: Progressing     Problem: PAIN - ADULT  Goal: Verbalizes/displays a

## 2019-05-27 NOTE — PROGRESS NOTES
[facility]  Progress Note     Jose Alfred  : 10/1/1960    Status: Inpatient  Day #: 2    Attending: Jeovanny Cantu MD  PCP: PHYSICIAN NONSTAFF      Assessment and Plan     NSTEMI  -exertional CP and elevated troponin  -EKG reviewed  -cardiol 05/25/19  2335 05/26/19  0551 05/26/19  0553 05/27/19  0524   BUN 24*  --   --  29*  --  39*   CREATSERUM 1.60*  --   --  1.62*  --  1.87*   GFRAA 55*  --   --  54*  --  45*   GFRNAA 47*  --   --  47*  --  39*   CA 9.1  --   --  8.8  --  9.0     -- Electronically signed on 05/26/2019 at 12:33 by José Clayton M.D.      Medications     • atorvastatin  40 mg Oral Nightly   • aspirin  325 mg Oral Daily   • FLUoxetine HCl  20 mg Oral Daily   • Levothyroxine Sodium  200 mcg Oral Before breakfast   • Bud

## 2019-05-27 NOTE — PROGRESS NOTES
[facility]  Progress Note    Mela Bynum Patient Status:  Inpatient    10/1/1960 MRN [id number]   Location [facility] 3W/SW Attending George Galicia MD   Hosp Day # 2 PCP PHYSICIAN NONSTAFF     Assessment:  1. NSTEMI  2.   CAD s/p PCI 2 years a K 4.7 05/27/2019     05/27/2019    CO2 25.0 05/27/2019    BUN 39 05/27/2019    CREATSERUM 1.87 05/27/2019    GLU 94 05/27/2019    MG 2.4 05/27/2019    CA 9.0 05/27/2019        Lab Results   Component Value Date    TROP 0.372 ([facility]) 05/26/2019    TROP 0

## 2019-05-27 NOTE — PLAN OF CARE
Pt voided 150ml in urinal. Bladder scan post-void only  84ml. MD notified. Normal saline infusing @83ml/hr. Will continue to monitor.

## 2019-05-27 NOTE — PLAN OF CARE
Pt had little to no urinary output overnight. Bladder scanned this am with retention of 500cc of urine. Mentioned ferrera and straight cath to patient. However he is refusing at this time.   Will reassess

## 2019-05-28 PROCEDURE — 99233 SBSQ HOSP IP/OBS HIGH 50: CPT | Performed by: INTERNAL MEDICINE

## 2019-05-28 PROCEDURE — 99233 SBSQ HOSP IP/OBS HIGH 50: CPT | Performed by: HOSPITALIST

## 2019-05-28 RX ORDER — ASPIRIN 81 MG/1
324 TABLET, CHEWABLE ORAL ONCE
Status: COMPLETED | OUTPATIENT
Start: 2019-05-29 | End: 2019-05-29

## 2019-05-28 RX ORDER — CHLORHEXIDINE GLUCONATE 4 G/100ML
30 SOLUTION TOPICAL
Status: ACTIVE | OUTPATIENT
Start: 2019-05-29 | End: 2019-05-29

## 2019-05-28 RX ORDER — MORPHINE SULFATE 4 MG/ML
5 INJECTION, SOLUTION INTRAMUSCULAR; INTRAVENOUS ONCE
Status: COMPLETED | OUTPATIENT
Start: 2019-05-28 | End: 2019-05-28

## 2019-05-28 RX ORDER — HYDROCODONE BITARTRATE AND ACETAMINOPHEN 5; 325 MG/1; MG/1
1 TABLET ORAL EVERY 4 HOURS PRN
Status: DISCONTINUED | OUTPATIENT
Start: 2019-05-28 | End: 2019-05-30

## 2019-05-28 RX ORDER — PHENAZOPYRIDINE HYDROCHLORIDE 100 MG/1
100 TABLET, FILM COATED ORAL 3 TIMES DAILY PRN
Status: DISCONTINUED | OUTPATIENT
Start: 2019-05-28 | End: 2019-06-01

## 2019-05-28 RX ORDER — SODIUM CHLORIDE 9 MG/ML
INJECTION, SOLUTION INTRAVENOUS CONTINUOUS
Status: DISCONTINUED | OUTPATIENT
Start: 2019-05-29 | End: 2019-05-30

## 2019-05-28 NOTE — CONSULTS
[facility]    Report of Consultation    Date of Admission:  5/25/2019  Date of Consult:  5/28/2019   Reason for Consultation:     CHARLES     History of Present Illness:     Patient is a 66 yrs old male with pmh of CAD s/p PCI, HTN, HL, CVA, Paternal Uncle    • Lipids Paternal Uncle    • Cancer Paternal Uncle        Social History  Patient Guardian Status:  Not on file    Other Topics            Concern    None on file    Social History Narrative    None on file            Current Medications: dyspnea  Gastrointestinal: negative for abdominal pain, diarrhea and nausea  Genitourinary: difficulty urinating.  No dysuria   Hematologic/lymphatic: negative for bleeding and easy bruising  Musculoskeletal: back and hip pain  Neurological: negative for ga 1.87* 1.53*   GFRAA 54* 45* 58*   GFRNAA 47* 39* 50*   CA 8.8 9.0 8.7    137 139   K 4.3 4.7 4.3    107 110   CO2 20.0* 25.0 23.0     PTT   Date Value Ref Range Status   05/28/2019 64.5 (H) 23.2 - 35.3 seconds Final     Comment:       Elevation Discussed with Nursing and Joyce Hong     Thank you for allowing me to participate in the care of your patient.     Jenny Nj MD  5/28/2019

## 2019-05-28 NOTE — DIETARY NOTE
ADULT NUTRITION EDUCATION NOTE:     Received consult per STEMI protocol for cardiac diet instruction. Pt c/o pain at this time and is resistant to diet education. Pt reports he is aware of recommendations however reports non-compliance.  Assisted pt with or

## 2019-05-28 NOTE — PAYOR COMM NOTE
--------------  ADMISSION REVIEW     Payor: Dustin Christensen #:  [id number]  Authorization Number: [id number]    Admit date: 5/25/19  Admit time: 2309       Admitting Physician: Juan Felix MD  Attending Physi • CATH PERCUTANEOUS  TRANSLUMINAL CORONARY ANGIOPLASTY     • HERNIA SURGERY  2012   • KIDNEY SURGERY      for stone   • REPAIR ING HERNIA,5+Y/O,REDUCIBL     • STENT PL SINGLE VES      2 cardiac stents x 2 2006 2x in 2014 3x in 2017           Social History Mouth/Throat: Oropharynx is clear and moist.   Eyes: Pupils are equal, round, and reactive to light. EOM are normal.   Neck: Normal range of motion. No carotid bruit   Cardiovascular: Normal rate and regular rhythm. No murmur heard.   2+ radial and DP p Hold Lavender Auto Resulted    RAINBOW DRAW LIGHT GREEN    Collection Time: 05/25/19  7:16 PM   Result Value Ref Range    Hold Lt Green Auto Resulted    RAINBOW DRAW GOLD    Collection Time: 05/25/19  7:16 PM   Result Value Ref Range    Hold Gold Auto Res HDL Cholesterol 21 (L) 40 - 59 mg/dL    Triglycerides 783 (H) 30 - 149 mg/dL    LDL Cholesterol  <100 mg/dL    VLDL  0 - 30 mg/dL    Non HDL Chol 222 (H) <130 mg/dL   PTT, ACTIVATED    Collection Time: 05/25/19  7:16 PM   Result Value Ref Range    PTT 28. The patient is currently a smoker. I spent greater than 3 minutes counseling the patient on smoking cessation. I explained the risks of smoking including chronic lung disease, lung cancer, heart attacks, and strokes.   I advised the patient to quit smokin Follow-up:  No follow-up provider specified.       Medications Prescribed:  Current Discharge Medication List        Present on Admission           ICD-10-CM Noted POA    [facility]non[facility]ST elevated [facility]) [facility] I21.4 5/25/2019 Unknown • HERNIA SURGERY  2012   • KIDNEY SURGERY      for stone   • REPAIR ING HERNIA,5+Y/O,REDUCIBL     • STENT PL SINGLE VES      2 cardiac stents x 2 2006 2x in 2014 3x in 2017       Family History   Problem Relation Age of Onset   • Heart Disorder Father    • Gastrointestinal:  Soft, non-tender, normal bowel sounds  Musculoskeletal:  No joint swelling  Extremities:  No edema, no cyanosis, no clubbing  Neurologic:  nonfocal  Psychiatric:  Normal affect, calm and appropriate  Skin:  No rash, no lesion     Results -plan is for cardiac cath on Tuesday    HTN  -con't meds and monitor    HL  -statin    H/o drug seeking behavior, narcotic usage  -requesting strong medications for exacerbation of low back pain  -morphine IV prn    Other:  -H/o CVA related to vertebral ar • Disorder of liver       \"enlarged\"   • Disorder of thyroid     • Essential hypertension     • Hearing impairment     • Heart attack ([address]     • Hernia, abdominal     • High blood pressure     • High cholesterol     • Muscle weakness       L side   • St BP (!) 154/96   Pulse 66   Temp 98.6 °F (37 °C)   Resp 12   Ht 5' 9\" (1.753 m)   Wt 218 lb 7.6 oz (99.1 kg)   SpO2 94%   BMI 32.26 kg/m²   Temp (24hrs), Av.6 °F (37 °C), Min:98.6 °F (37 °C), Max:98.6 °F (37 °C)     No intake or output data in the 24 h Thank you for allowing me to participate in the care of your patient.     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  C/o Ct Chest Pain/pe (iv Only) (cpt=71260)     Result Date: 5/26/2019  CONCLUSION:     1. No pulmonary embolus. 2. Multivessel coronary artery calcification. 3. Probable LVH.   Because the exam was non gated, follow-up cardiac echo recommended if not previously PRN Meds: traMADol HCl, morphINE sulfate (PF), morphINE sulfate (PF), Normal Saline Flush, Atropine Sulfate, nitroGLYCERIN, ondansetron HCl, Metoclopramide HCl     Spent >35 minutes, >50% of the time counseling coordinating care.   Discussed pain control, p Gastrointestinal:  Soft, non-tender, normal bowel sounds  Musculoskeletal:  No joint swelling  Extremities:  No edema, no cyanosis, no clubbing  Neurologic:  nonfocal  Psychiatric:  Normal affect, calm and appropriate  Skin:  No rash, no lesion     Intake/ 5/28/2019 0252 Given 4 mg Intravenous Zeina Wilder RN    5/27/2019 2214 Given 4 mg Intravenous Zeina Wilder RN    5/27/2019 1759 Given 4 mg Intravenous Dioni Hook RN    5/27/2019 1357 Given 4 mg Intravenous Dioni Hook RN      morphINE sulfate

## 2019-05-28 NOTE — PROGRESS NOTES
[facility]  Progress Note    Sanjuanita Garcia Patient Status:  Inpatient    10/1/1960 MRN [id number]   Location [facility] 3W/SW Attending Brenda Plascencia MD   Hosp Day # 3 PCP PHYSICIAN NONSTAFF     Assessment:  1. NSTEMI  2.   CAD s/p PCI 2 years a 05/28/2019    CA 8.7 05/28/2019        Lab Results   Component Value Date    TROP 0.372 () 05/26/2019    TROP 0.470 () 05/25/2019    TROP 0.404 () 05/25/2019        Medications:    • finasteride  5 mg Oral Daily   • gabapentin  100 mg Oral TID   • at

## 2019-05-28 NOTE — PROGRESS NOTES
[facility]  Progress Note     Porrasgurpreet Rose  : 10/1/1960    Status: Inpatient  Day #: 3    Attending: Last Blevins MD  PCP: PHYSICIAN NONSTAFF      Assessment and Plan     NSTEMI  -exertional CP and elevated troponin on admission  -EKG revie 05/28/19  0520   WBC 7.2 6.8  --   --    HGB 14.1 13.4  --   --    HCT 40.9 39.1  --   --    .0 203.0 183.0 177.0   RBC 4.88 4.62  --   --    MCV 83.8 84.6  --   --    MCH 28.9 29.0  --   --    MCHC 34.5 34.3  --   --    RDW 13.9 14.0  --   --    NE

## 2019-05-28 NOTE — PLAN OF CARE
Problem: Patient Centered Care  Goal: Patient preferences are identified and integrated in the patient's plan of care  Description  Interventions:  - What would you like us to know as we care for you?  I am in pain  - Provide timely, complete, and accurat appropriate pain scale  - Administer analgesics based on type and severity of pain and evaluate response  - Implement non-pharmacological measures as appropriate and evaluate response  - Consider cultural and social influences on pain and pain management

## 2019-05-29 ENCOUNTER — APPOINTMENT (OUTPATIENT)
Dept: INTERVENTIONAL RADIOLOGY/VASCULAR | Facility: HOSPITAL | Age: 59
DRG: 247 | End: 2019-05-29
Attending: NURSE PRACTITIONER
Payer: MEDICAID

## 2019-05-29 PROCEDURE — B2111ZZ FLUOROSCOPY OF MULTIPLE CORONARY ARTERIES USING LOW OSMOLAR CONTRAST: ICD-10-PCS | Performed by: INTERNAL MEDICINE

## 2019-05-29 PROCEDURE — 4A023N7 MEASUREMENT OF CARDIAC SAMPLING AND PRESSURE, LEFT HEART, PERCUTANEOUS APPROACH: ICD-10-PCS | Performed by: INTERNAL MEDICINE

## 2019-05-29 PROCEDURE — 99233 SBSQ HOSP IP/OBS HIGH 50: CPT | Performed by: HOSPITALIST

## 2019-05-29 PROCEDURE — 99233 SBSQ HOSP IP/OBS HIGH 50: CPT | Performed by: INTERNAL MEDICINE

## 2019-05-29 RX ORDER — VERAPAMIL HYDROCHLORIDE 2.5 MG/ML
INJECTION, SOLUTION INTRAVENOUS
Status: COMPLETED
Start: 2019-05-29 | End: 2019-05-29

## 2019-05-29 RX ORDER — SODIUM CHLORIDE 9 MG/ML
INJECTION, SOLUTION INTRAVENOUS CONTINUOUS
Status: ACTIVE | OUTPATIENT
Start: 2019-05-29 | End: 2019-05-30

## 2019-05-29 RX ORDER — DIPHENHYDRAMINE HYDROCHLORIDE 50 MG/ML
INJECTION INTRAMUSCULAR; INTRAVENOUS
Status: COMPLETED
Start: 2019-05-29 | End: 2019-05-29

## 2019-05-29 RX ORDER — MIDAZOLAM HYDROCHLORIDE 1 MG/ML
INJECTION INTRAMUSCULAR; INTRAVENOUS
Status: DISCONTINUED
Start: 2019-05-29 | End: 2019-05-29 | Stop reason: WASHOUT

## 2019-05-29 RX ORDER — MIDAZOLAM HYDROCHLORIDE 1 MG/ML
INJECTION INTRAMUSCULAR; INTRAVENOUS
Status: COMPLETED
Start: 2019-05-29 | End: 2019-05-29

## 2019-05-29 RX ORDER — HEPARIN SODIUM 1000 [USP'U]/ML
INJECTION, SOLUTION INTRAVENOUS; SUBCUTANEOUS
Status: COMPLETED
Start: 2019-05-29 | End: 2019-05-29

## 2019-05-29 RX ORDER — LIDOCAINE HYDROCHLORIDE 20 MG/ML
INJECTION, SOLUTION EPIDURAL; INFILTRATION; INTRACAUDAL; PERINEURAL
Status: COMPLETED
Start: 2019-05-29 | End: 2019-05-29

## 2019-05-29 NOTE — PLAN OF CARE
- discussed in detail with patient rationale for cath and procedure.   - he is agreeable  - all questions answered        DAGMAR Luu  S Cardiology  05/29/19

## 2019-05-29 NOTE — PROGRESS NOTES
[facility]    Progress Note      Subjective:     C/o back pain and asking for more pain meds. No chest pain, NV.  S/p ferrera catheter placement yesterday       Review of Systems:     Constitutional: negative for fatigue, fevers and weight l per tab 1 tablet 1 tablet Oral Q4H PRN   finasteride (PROSCAR) tab 5 mg 5 mg Oral Daily   gabapentin (NEURONTIN) cap 100 mg 100 mg Oral TID   0.9%  NaCl infusion  Intravenous Continuous   traMADol HCl (ULTRAM) tab 50 mg 50 mg Oral Q6H PRN   heparin (PORCIN of the aPTT in patients not receiving  anticoagulant therapy (Heparin, etc.), may be  seen in Factor deficiency, vitamin K deficiency,  factor inhibitors, liver disease, etc.  Clinical correlation is recommended.      07/20/2018 28.3 26.1 - 34.6 seconds Fin

## 2019-05-29 NOTE — PLAN OF CARE
Problem: CARDIOVASCULAR - ADULT  Goal: Maintains optimal cardiac output and hemodynamic stability  Description  INTERVENTIONS:  - Monitor vital signs, rhythm, and trends  - Monitor for bleeding, hypotension and signs of decreased cardiac output  - Evalua Goal  Description  Patient's Short Term Goal: to not be in pain    Interventions:   - IV morphine  - PO tramadol  -Nitro prn  -Calm environment  - See additional Care Plan goals for specific interventions   Outcome: Not Progressing     Problem: PAIN - ADUL

## 2019-05-29 NOTE — PROCEDURES
[facility]  [facility] Cardiac Cath Procedure Note  Ruddy Alvarado Patient Status:  Inpatient    10/1/1960 MRN [id number]   Location [facility] Attending Marcela Nino, [address] [facility] Se Day # 4 PCP PHYSICIAN NONSTAFF       Card

## 2019-05-29 NOTE — PLAN OF CARE
Problem: Patient Centered Care  Goal: Patient preferences are identified and integrated in the patient's plan of care  Description  Interventions:  - What would you like us to know as we care for you?  I am in pain  - Provide timely, complete, and accurat hemodynamic stability  Description  INTERVENTIONS:  - Monitor vital signs, rhythm, and trends  - Monitor for bleeding, hypotension and signs of decreased cardiac output  - Evaluate effectiveness of vasoactive medications to optimize hemodynamic stability

## 2019-05-29 NOTE — CARDIAC REHAB
Cardiac Rehab Phase I    Activity:  Distance   Assistance needed   Patient tolerated activity . Education:  Handouts provided and reviewed: [address] Diet: Healthy Cardiac diet reviewed.     Disease Process: Disease process rev

## 2019-05-29 NOTE — PROGRESS NOTES
Procedure hand off report given to [employer]. Pt's vital signs are stable. Procedural access site (R + L groin) are dry and intact with no signs and symptoms of bleeding and hematoma.    TR Band in place with 11 ml of air  Dr. Bessie Mabry spoke with famil

## 2019-05-29 NOTE — PROGRESS NOTES
Patient [id number] to floor with tele , patient more awake, TR band in place.  Updated report given to Birgit, vitals stable

## 2019-05-29 NOTE — PROGRESS NOTES
[facility]    Progress Note    Mikala Hart Patient Status:  Inpatient    10/1/1960 MRN [id number]   Location [facility] 3W/SW Attending Sarina Soto, [address] [facility] Se Day # 4 PCP PHYSICIAN NONSTAFF       Subjective:     Denies chest vertebral artery dissection  -hypothyroidism     dvt proph:   Heparin drip    Code status:   Full    >35 minutes spent     Ian James MD  5/29/2019

## 2019-05-29 NOTE — PLAN OF CARE
Inpatient Throughput Communication:    Called inpatient RN Ju Delacruz and notified of scheduled procedure Left heart Cath Poss on 5/29. Verified that appropriate consent is signed: Yes  Appropriate Consent Signed:  Yes  Access Site Hair Clipped and skin p

## 2019-05-30 ENCOUNTER — APPOINTMENT (OUTPATIENT)
Dept: ULTRASOUND IMAGING | Facility: HOSPITAL | Age: 59
DRG: 247 | End: 2019-05-30
Attending: CLINICAL NURSE SPECIALIST
Payer: MEDICAID

## 2019-05-30 ENCOUNTER — APPOINTMENT (OUTPATIENT)
Dept: GENERAL RADIOLOGY | Facility: HOSPITAL | Age: 59
DRG: 247 | End: 2019-05-30
Attending: CLINICAL NURSE SPECIALIST
Payer: MEDICAID

## 2019-05-30 PROCEDURE — 99233 SBSQ HOSP IP/OBS HIGH 50: CPT | Performed by: INTERNAL MEDICINE

## 2019-05-30 PROCEDURE — 99233 SBSQ HOSP IP/OBS HIGH 50: CPT | Performed by: HOSPITALIST

## 2019-05-30 PROCEDURE — 71046 X-RAY EXAM CHEST 2 VIEWS: CPT | Performed by: CLINICAL NURSE SPECIALIST

## 2019-05-30 PROCEDURE — 93880 EXTRACRANIAL BILAT STUDY: CPT | Performed by: CLINICAL NURSE SPECIALIST

## 2019-05-30 PROCEDURE — 99221 1ST HOSP IP/OBS SF/LOW 40: CPT | Performed by: NURSE PRACTITIONER

## 2019-05-30 RX ORDER — SODIUM CHLORIDE 9 MG/ML
INJECTION, SOLUTION INTRAVENOUS CONTINUOUS
Status: DISCONTINUED | OUTPATIENT
Start: 2019-05-30 | End: 2019-05-30

## 2019-05-30 RX ORDER — LACTULOSE 10 G/15ML
20 SOLUTION ORAL EVERY 6 HOURS PRN
Status: DISCONTINUED | OUTPATIENT
Start: 2019-05-30 | End: 2019-06-01

## 2019-05-30 RX ORDER — DOCUSATE SODIUM 100 MG/1
100 CAPSULE, LIQUID FILLED ORAL 2 TIMES DAILY
Status: DISCONTINUED | OUTPATIENT
Start: 2019-05-30 | End: 2019-06-01

## 2019-05-30 RX ORDER — CLOPIDOGREL BISULFATE 75 MG/1
300 TABLET ORAL ONCE
Status: COMPLETED | OUTPATIENT
Start: 2019-05-30 | End: 2019-05-30

## 2019-05-30 RX ORDER — ASPIRIN 81 MG/1
81 TABLET ORAL DAILY
Status: DISCONTINUED | OUTPATIENT
Start: 2019-05-30 | End: 2019-05-31

## 2019-05-30 RX ORDER — CLOPIDOGREL BISULFATE 75 MG/1
75 TABLET ORAL DAILY
Status: DISCONTINUED | OUTPATIENT
Start: 2019-05-31 | End: 2019-05-31

## 2019-05-30 RX ORDER — POLYETHYLENE GLYCOL 3350 17 G/17G
17 POWDER, FOR SOLUTION ORAL 2 TIMES DAILY
Status: DISCONTINUED | OUTPATIENT
Start: 2019-05-30 | End: 2019-06-01

## 2019-05-30 RX ORDER — ASPIRIN 81 MG/1
324 TABLET, CHEWABLE ORAL ONCE
Status: DISCONTINUED | OUTPATIENT
Start: 2019-05-30 | End: 2019-05-31

## 2019-05-30 RX ORDER — OXYCODONE AND ACETAMINOPHEN 7.5; 325 MG/1; MG/1
1 TABLET ORAL EVERY 6 HOURS PRN
Status: DISCONTINUED | OUTPATIENT
Start: 2019-05-30 | End: 2019-06-01

## 2019-05-30 RX ORDER — HEPARIN SODIUM 5000 [USP'U]/ML
5000 INJECTION, SOLUTION INTRAVENOUS; SUBCUTANEOUS EVERY 12 HOURS
Status: DISCONTINUED | OUTPATIENT
Start: 2019-05-30 | End: 2019-06-01

## 2019-05-30 RX ORDER — SODIUM CHLORIDE 9 MG/ML
INJECTION, SOLUTION INTRAVENOUS CONTINUOUS
Status: DISCONTINUED | OUTPATIENT
Start: 2019-05-31 | End: 2019-05-31

## 2019-05-30 RX ORDER — BISACODYL 10 MG
10 SUPPOSITORY, RECTAL RECTAL
Status: DISCONTINUED | OUTPATIENT
Start: 2019-05-30 | End: 2019-06-01

## 2019-05-30 RX ORDER — CHLORHEXIDINE GLUCONATE 4 G/100ML
30 SOLUTION TOPICAL
Status: COMPLETED | OUTPATIENT
Start: 2019-05-31 | End: 2019-05-31

## 2019-05-30 NOTE — CONSULTS
[facility]    Report of Consultation    Beatris Sarah Beth Patient Status:  Inpatient    10/1/1960 MRN [id number]   [facility] 3W/SW Attending Karolyn Bhakta MD   Hosp Day # 5 PCP PHYSICIAN NONSTAFF     Date of Admission:   Hernia, abdominal    • High blood pressure    • High cholesterol    • Muscle weakness     L side   • Stroke ([facility] [address] 06/19/2018   • Thyroid disease    • Visual impairment     L eye - lazy eye     Past Surgical History:   Procedure Laterality Date   • CATH MILES chest pain. Gastrointestinal: Negative for nausea, vomiting, abdominal pain, diarrhea and constipation. Genitourinary: Positive for difficulty urinating and nocturia.  Negative for dysuria, urgency, frequency, hematuria, scrotal swelling and penile pain 0.372 () 05/26/2019     Xr Chest Pa + Lat Chest (cpt=71046)    Result Date: 5/30/2019  CONCLUSION:  1. Borderline cardiomegaly. Tortuous aorta. 2. Prominent basilar markings . No acute pulmonary consolidation.  3. Chronic left basilar pleural reaction b and weaning off morphine  4. Follow up in office after discharge for further management of voiding dysfunction    Thank you for allowing us to participate in the care of your patient. Please do not hesitate to contact us with any questions or concerns.

## 2019-05-30 NOTE — PROGRESS NOTES
[facility]    Progress Note      Subjective:     State doesn't want heart surgery.  Encouraged patient to sit on chair / go for walk    Off of heparin gtt      Review of Systems:     Constitutional: negative for fatigue, fevers and weight Intravenous Continuous   Phenazopyridine HCl (PYRIDIUM) tab 100 mg 100 mg Oral TID PRN   HYDROcodone-acetaminophen (NORCO) 5-325 MG per tab 1 tablet 1 tablet Oral Q4H PRN   finasteride (PROSCAR) tab 5 mg 5 mg Oral Daily   gabapentin (NEURONTIN) cap 100 mg AST  --   --  30   ALT  --   --  45   BILT  --   --  0.3   TP  --   --  6.7     PTT   Date Value Ref Range Status   05/30/2019 67.9 (H) 23.2 - 35.3 seconds Final     Comment:       Elevations of the aPTT in patients not receiving  anticoagulant therapy ( hrs post contrast   - risks and benefit of JOHANNY explained. Risk of JOHANNY <20%   - DC NSAIDs on discharge -discuss with patient      2. NSTEMI  - off of heparin  - start on plavix. asa and BB  - s/p angiogram on 5/29 - MVD .  Refusing surgery  - discussed with

## 2019-05-30 NOTE — PROGRESS NOTES
[facility]    Cardiology Progress Note    Brannon Borrego Patient Status:  Inpatient    10/1/1960 MRN [id number]   Location [facility] 3W/SW Attending Kindra Hazel, [address] [facility] Se Day # 5 PCP PHYSICIAN NONSTAFF     2019  Dinah Gentile 139 141 140   K 4.3 4.7 4.3 4.3 4.1    107 110 112 110   CO2 20.0* 25.0 23.0 22.0 23.0   BUN 29* 39* 32* 26* 22*   CREATSERUM 1.62* 1.87* 1.53* 1.55* 1.47*   CA 8.8 9.0 8.7 8.6 8.9   MG  --  2.4  --   --   --    PHOS  --   --   --  3.7  --     Oral Daily with breakfast         Assessment:  Patient Active Problem List:     Acute chest pain     Coronary artery disease involving native coronary artery of native heart without angina pectoris     Vertebral artery dissection (HCC)     Dyslipidemia

## 2019-05-30 NOTE — PROGRESS NOTES
Patient currently in ultrasound. Came in with non-STEMI and cath shows significant disease in LAD and OM. Ramus with stent throughout 100% occluded. Right 100% occluded with small target. Left ventricle preserved by echo.   Has significant renal impairm

## 2019-05-30 NOTE — PAYOR COMM NOTE
REF: [id number] APPROVED 5/25/19- 5/30/19- TONYA CLINICAL UPDATE FOR 5/30/19 REQUESTING ADDITIONAL DAYS    5/30/19  Subjective: no events overnight.     Objective:   Temp: 97.5 °F (36.4 °C)  Pulse: 59  Resp: 18  BP: 148/97     Intake/Output:      Intake/O BUN 29* 39* 32* 26* 22*   CREATSERUM 1.62* 1.87* 1.53* 1.55* 1.47*   CA 8.8 9.0 8.7 8.6 8.9   MG  --  2.4  --   --   --    PHOS  --   --   --  3.7  --    * 94 88 101* 86              Recent Labs   Lab 05/29/19  0529 05/30/19  0549   ALT  --  45 List:     Acute chest pain     Coronary artery disease involving native coronary artery of native heart without angina pectoris     Vertebral artery dissection (HCC)     Dyslipidemia     Benign essential HTN     Dizziness and giddiness     Cerebrovascular 100 mg Oral     5/29/2019 1817 Given 100 mg Oral       HYDROcodone-acetaminophen (NORCO) 5-325 MG per tab 1 tablet     Date Action Dose Route     5/30/2019 1234 Given 1 tablet Oral     5/30/2019 0310 Given 1 tablet Oral     5/29/2019 2007 Given 1 tablet Or

## 2019-05-30 NOTE — PROGRESS NOTES
Patient returned from ultrasound and I came by to do a cardiac surgery consultation. The patient stated that we just want to cut him open and he wants nothing to do with his heart surgery.   I asked him if he wanted me to do a formal consultation and rayray

## 2019-05-30 NOTE — PLAN OF CARE
Problem: Patient Centered Care  Goal: Patient preferences are identified and integrated in the patient's plan of care  Description  Interventions:  - What would you like us to know as we care for you? I am in pain and I live at home with my family.   - Pr optimal cardiac output and hemodynamic stability  Description  INTERVENTIONS:  - Monitor vital signs, rhythm, and trends  - Monitor for bleeding, hypotension and signs of decreased cardiac output  - Evaluate effectiveness of vasoactive medications to optim

## 2019-05-31 ENCOUNTER — APPOINTMENT (OUTPATIENT)
Dept: INTERVENTIONAL RADIOLOGY/VASCULAR | Facility: HOSPITAL | Age: 59
DRG: 247 | End: 2019-05-31
Attending: INTERNAL MEDICINE
Payer: MEDICAID

## 2019-05-31 PROCEDURE — 4A023N7 MEASUREMENT OF CARDIAC SAMPLING AND PRESSURE, LEFT HEART, PERCUTANEOUS APPROACH: ICD-10-PCS | Performed by: INTERNAL MEDICINE

## 2019-05-31 PROCEDURE — B240ZZ3 ULTRASONOGRAPHY OF SINGLE CORONARY ARTERY, INTRAVASCULAR: ICD-10-PCS | Performed by: INTERNAL MEDICINE

## 2019-05-31 PROCEDURE — 99233 SBSQ HOSP IP/OBS HIGH 50: CPT | Performed by: INTERNAL MEDICINE

## 2019-05-31 PROCEDURE — B2111ZZ FLUOROSCOPY OF MULTIPLE CORONARY ARTERIES USING LOW OSMOLAR CONTRAST: ICD-10-PCS | Performed by: INTERNAL MEDICINE

## 2019-05-31 PROCEDURE — 027135Z DILATION OF CORONARY ARTERY, TWO ARTERIES WITH TWO DRUG-ELUTING INTRALUMINAL DEVICES, PERCUTANEOUS APPROACH: ICD-10-PCS | Performed by: INTERNAL MEDICINE

## 2019-05-31 RX ORDER — SODIUM CHLORIDE 9 MG/ML
INJECTION, SOLUTION INTRAVENOUS CONTINUOUS
Status: ACTIVE | OUTPATIENT
Start: 2019-05-31 | End: 2019-06-01

## 2019-05-31 RX ORDER — VERAPAMIL HYDROCHLORIDE 2.5 MG/ML
INJECTION, SOLUTION INTRAVENOUS
Status: DISCONTINUED
Start: 2019-05-31 | End: 2019-05-31 | Stop reason: WASHOUT

## 2019-05-31 RX ORDER — MIDAZOLAM HYDROCHLORIDE 1 MG/ML
INJECTION INTRAMUSCULAR; INTRAVENOUS
Status: DISPENSED
Start: 2019-05-31 | End: 2019-06-01

## 2019-05-31 RX ORDER — HEPARIN SODIUM 1000 [USP'U]/ML
INJECTION, SOLUTION INTRAVENOUS; SUBCUTANEOUS
Status: DISCONTINUED
Start: 2019-05-31 | End: 2019-05-31 | Stop reason: WASHOUT

## 2019-05-31 RX ORDER — SODIUM CHLORIDE 9 MG/ML
INJECTION, SOLUTION INTRAVENOUS
Status: DISPENSED
Start: 2019-05-31 | End: 2019-06-01

## 2019-05-31 RX ORDER — DIPHENHYDRAMINE HYDROCHLORIDE 50 MG/ML
INJECTION INTRAMUSCULAR; INTRAVENOUS
Status: DISCONTINUED
Start: 2019-05-31 | End: 2019-05-31 | Stop reason: WASHOUT

## 2019-05-31 RX ORDER — MIDAZOLAM HYDROCHLORIDE 1 MG/ML
INJECTION INTRAMUSCULAR; INTRAVENOUS
Status: COMPLETED
Start: 2019-05-31 | End: 2019-05-31

## 2019-05-31 RX ORDER — ASPIRIN 81 MG/1
324 TABLET, CHEWABLE ORAL ONCE
Status: COMPLETED | OUTPATIENT
Start: 2019-05-31 | End: 2019-05-31

## 2019-05-31 RX ORDER — OXYCODONE AND ACETAMINOPHEN 7.5; 325 MG/1; MG/1
TABLET ORAL
Status: COMPLETED
Start: 2019-05-31 | End: 2019-05-31

## 2019-05-31 RX ORDER — LIDOCAINE HYDROCHLORIDE 20 MG/ML
INJECTION, SOLUTION EPIDURAL; INFILTRATION; INTRACAUDAL; PERINEURAL
Status: COMPLETED
Start: 2019-05-31 | End: 2019-05-31

## 2019-05-31 RX ORDER — OXYCODONE AND ACETAMINOPHEN 7.5; 325 MG/1; MG/1
1 TABLET ORAL ONCE
Status: COMPLETED | OUTPATIENT
Start: 2019-05-31 | End: 2019-05-31

## 2019-05-31 RX ORDER — ASPIRIN 81 MG/1
81 TABLET ORAL DAILY
Status: DISCONTINUED | OUTPATIENT
Start: 2019-05-31 | End: 2019-06-01

## 2019-05-31 RX ORDER — HYDROMORPHONE HYDROCHLORIDE 1 MG/ML
0.8 INJECTION, SOLUTION INTRAMUSCULAR; INTRAVENOUS; SUBCUTANEOUS EVERY 4 HOURS PRN
Status: DISCONTINUED | OUTPATIENT
Start: 2019-05-31 | End: 2019-06-01

## 2019-05-31 RX ORDER — HYDRALAZINE HYDROCHLORIDE 20 MG/ML
INJECTION INTRAMUSCULAR; INTRAVENOUS
Status: COMPLETED
Start: 2019-05-31 | End: 2019-05-31

## 2019-05-31 RX ORDER — DIPHENHYDRAMINE HYDROCHLORIDE 50 MG/ML
INJECTION INTRAMUSCULAR; INTRAVENOUS
Status: COMPLETED
Start: 2019-05-31 | End: 2019-05-31

## 2019-05-31 RX ORDER — MIDAZOLAM HYDROCHLORIDE 1 MG/ML
2 INJECTION INTRAMUSCULAR; INTRAVENOUS AS NEEDED
Status: ACTIVE | OUTPATIENT
Start: 2019-05-31 | End: 2019-05-31

## 2019-05-31 RX ORDER — MIDAZOLAM HYDROCHLORIDE 1 MG/ML
INJECTION INTRAMUSCULAR; INTRAVENOUS
Status: DISCONTINUED
Start: 2019-05-31 | End: 2019-05-31 | Stop reason: WASHOUT

## 2019-05-31 NOTE — PROGRESS NOTES
Inpatient Throughput Communication:    Called inpatient RN Yamileth Arce  and notified of scheduled procedure PCI on 5/31 1200. Verified that appropriate consent is signed: Yes  Appropriate Consent Signed: Yes  Access Site Hair Clipped and skin prepped:  Heather Shaver

## 2019-05-31 NOTE — PROGRESS NOTES
[facility]    Progress Note      Subjective:     Encouraged patient to sit on chair / go for walk    Scheduled for angiogram today     Review of Systems:     Constitutional: negative for fatigue, fevers and weight loss  Eyes: negative for docusate sodium (COLACE) cap 100 mg 100 mg Oral BID   PEG 3350 (MIRALAX) powder packet 17 g 17 g Oral BID   bisacodyl (DULCOLAX) rectal suppository 10 mg 10 mg Rectal Daily PRN   lactulose (CHRONULAC) 10 GM/15ML solution 20 g 20 g Oral Q6H PRN   Heparin 4.1 4.2    110 109   CO2 22.0 23.0 23.0   ALKPHO  --  97  --    AST  --  30  --    ALT  --  45  --    BILT  --  0.3  --    TP  --  6.7  --      PTT   Date Value Ref Range Status   05/31/2019 30.8 23.2 - 35.3 seconds Final   07/20/2018 28.3 26.1 - 34. BB  - s/p angiogram on 5/29 - MVD .  Refusing surgery  - angiogram with possible PCI today     Discussed with Nursing and family at bedside (wife)    Evelin Chow MD  5/31/2019

## 2019-05-31 NOTE — PAYOR COMM NOTE
--------------  CONTINUED STAY REVIEW    Payor: Dustin Christensen #:  [id number]  Authorization Number: [id number]    Admit date: 5/25/19  Admit time: 2309    Admitting Physician: Carmelina Barros MD  Attending Phys 5/30/2019 2144 Given 100 mg Oral Emily Shine RN    5/30/2019 1551 Given 100 mg Oral Trice Bruno RN      Heparin Sodium (Porcine) 5000 UNIT/ML injection 5,000 Units     Date Action Dose Route User    5/30/2019 2144 Given 5000 Units Subcutaneou Eyes: negative for irritation, redness and visual disturbance  Ears, nose, mouth, throat, and face: negative for hearing loss and sore throat  Respiratory: negative for cough, hemoptysis and wheezing  Cardiovascular: negative for chest pain, exertional dys lactulose (CHRONULAC) 10 GM/15ML solution 20 g 20 g Oral Q6H PRN   Heparin Sodium (Porcine) 5000 UNIT/ML injection 5,000 Units 5,000 Units Subcutaneous Q12H   0.9%  NaCl infusion   Intravenous Continuous   Phenazopyridine HCl (PYRIDIUM) tab 100 mg 100 mg O PTT   Date Value Ref Range Status   05/31/2019 30.8 23.2 - 35.3 seconds Final   07/20/2018 28.3 26.1 - 34.6 seconds Final       Comment:       The aPTT Heparin Therapeutic Range is approximately 65- 104 seconds.  The therapeutic range has been validated aga Discussed with Nursing and family at bedside (wife)     Evelin Chow MD  5/31/2019               Electronically signed by Joseph Craven MD at 5/31/2019  1:25 PM       ED to Hosp-Admission (Current) on 5/25/2019            Detailed Report        Alan Gentile After written informed consent was obtained from the patient, patient was brought to the cardiac catheterization laboratory. Patient was prepped and draped in the usual sterile fashion.  Lidocaine 1% was used to infiltrate the LEFT groin for local anesthes

## 2019-05-31 NOTE — OCCUPATIONAL THERAPY NOTE
Orders received, chart reviewed for OT evaluation. Pt going for cardiac cath. Need activity clearance from cardiology post procedure. RN aware. Will follow-up tomorrow as pt appropriate.

## 2019-05-31 NOTE — PROCEDURES
[facility] [place] [place]  [facility] Cardiac Cath Procedure Note  Simon Dahl Patient Status:  Inpatient    10/1/1960 MRN [id number]   Location [facility] Attending Tammy Torres, [address] [facility] Se Day # 6 PCP PHYSICIAN NONSTAFF       Card rate and blood pressure by nurse and myself during the exam 76 minutes.       Chun Montes MD  05/31/19

## 2019-05-31 NOTE — PROGRESS NOTES
Dr. Bessie Mabry spoke with patient and family in the room     5/31 1306   Procedure hand off report given to [address] [place] [id number]. Pain med given for back pain, reviewed dosage and times with receiving RN and will update for additional dosing. See also MAR.

## 2019-05-31 NOTE — PLAN OF CARE
Patient admitted with NSTEMI. Patient had cath which showed multivessel disease with occluded stents. Patient is refusing CABG. Repeat cath today to attempt stents. Patient is c/o severe pain. Percocet ordered Q6 hours PRN. Patient c/o pain despite meds.  Mignon Estes pain and pain management  - Manage/alleviate anxiety  - Utilize distraction and/or relaxation techniques  - Monitor for opioid side effects  - Notify MD/LIP if interventions unsuccessful or patient reports new pain  - Anticipate increased pain with activit

## 2019-05-31 NOTE — PHYSICAL THERAPY NOTE
PT orders for eval and treat received. Spoke with RN, Torres Cassette. Pt going down for stent placement this morning. Also discussed needing activity clearance from cardiology after stent placement. Will follow-up with pt tomorrow as able/approrpriate.

## 2019-06-01 VITALS
SYSTOLIC BLOOD PRESSURE: 110 MMHG | HEIGHT: 70.8 IN | HEART RATE: 62 BPM | WEIGHT: 224.38 LBS | TEMPERATURE: 98 F | BODY MASS INDEX: 31.41 KG/M2 | OXYGEN SATURATION: 98 % | RESPIRATION RATE: 16 BRPM | DIASTOLIC BLOOD PRESSURE: 82 MMHG

## 2019-06-01 PROCEDURE — 99233 SBSQ HOSP IP/OBS HIGH 50: CPT | Performed by: INTERNAL MEDICINE

## 2019-06-01 PROCEDURE — 99239 HOSP IP/OBS DSCHRG MGMT >30: CPT | Performed by: HOSPITALIST

## 2019-06-01 RX ORDER — ISOSORBIDE MONONITRATE 30 MG/1
30 TABLET, EXTENDED RELEASE ORAL DAILY
Status: DISCONTINUED | OUTPATIENT
Start: 2019-06-01 | End: 2019-06-01

## 2019-06-01 RX ORDER — OXYCODONE AND ACETAMINOPHEN 7.5; 325 MG/1; MG/1
1 TABLET ORAL EVERY 6 HOURS PRN
Qty: 20 TABLET | Refills: 0 | Status: ON HOLD | OUTPATIENT
Start: 2019-06-01 | End: 2020-06-17 | Stop reason: ALTCHOICE

## 2019-06-01 RX ORDER — ATORVASTATIN CALCIUM 40 MG/1
40 TABLET, FILM COATED ORAL NIGHTLY
Qty: 30 TABLET | Refills: 1 | Status: SHIPPED | OUTPATIENT
Start: 2019-06-01

## 2019-06-01 RX ORDER — FINASTERIDE 5 MG/1
5 TABLET, FILM COATED ORAL DAILY
Qty: 30 TABLET | Refills: 1 | Status: SHIPPED | OUTPATIENT
Start: 2019-06-02

## 2019-06-01 RX ORDER — ASPIRIN 81 MG/1
81 TABLET ORAL DAILY
Qty: 30 TABLET | Refills: 1 | Status: SHIPPED | OUTPATIENT
Start: 2019-06-02

## 2019-06-01 RX ORDER — TAMSULOSIN HYDROCHLORIDE 0.4 MG/1
0.4 CAPSULE ORAL DAILY
Qty: 30 CAPSULE | Refills: 1 | Status: SHIPPED | OUTPATIENT
Start: 2019-06-01

## 2019-06-01 RX ORDER — POLYETHYLENE GLYCOL 3350 17 G/17G
17 POWDER, FOR SOLUTION ORAL 2 TIMES DAILY PRN
Qty: 30 EACH | Refills: 0 | Status: SHIPPED | OUTPATIENT
Start: 2019-06-01

## 2019-06-01 RX ORDER — ISOSORBIDE MONONITRATE 30 MG/1
30 TABLET, EXTENDED RELEASE ORAL DAILY
Qty: 30 TABLET | Refills: 1 | Status: ON HOLD | OUTPATIENT
Start: 2019-06-02 | End: 2020-06-23

## 2019-06-01 NOTE — OCCUPATIONAL THERAPY NOTE
OCCUPATIONAL THERAPY EVALUATION - INPATIENT     Room Number: 332/332-A  Evaluation Date: 6/1/2019  Type of Evaluation: Initial       Physician Order: IP Consult to Occupational Therapy  Reason for Therapy: ADL/IADL Dysfunction and Discharge Planning    [facility] services, however, Pt does have family assist him w/ ADLS at this time and Pt is interested in out patient PT.     DISCHARGE RECOMMENDATIONS          PLAN  OT Treatment Plan: Balance activities; ADL training;Functional transfer training;Patient/Family educa High fall risk    PAIN ASSESSMENT  Ratin  Location: neck, back, hips       ACTIVITY TOLERANCE  Pulse: 71  Heart Rate Source: Monitor                   O2 SATURATIONS  SPO2 on Room Air at Rest: 98  SPO2 Ambulation on Room Air: 96          COGNITION  Jeyson Standing: CGA  Dynamic Standing: NT    FUNCTIONAL ADL ASSESSMENT  Grooming: NT  Feeding: mod I  Bathing: NT  Toileting: NT  Upper Extremity Dressing: NT  Lower Extremity Dressing: mod a    Education Provided: Pt was educated in POC, role of OT, safety w/ A

## 2019-06-01 NOTE — PROGRESS NOTES
[facility]    Progress Note      Subjective:     Working with PT    No chest pain or sob    Review of Systems:     Constitutional: negative for fatigue, fevers and weight loss  Eyes: negative for irritation, redness and visual disturbance mg 0.8 mg Intravenous Q4H PRN   oxycodone-acetaminophen (PERCOCET) tab 7.5-325 mg 1 tablet Oral Q6H PRN   docusate sodium (COLACE) cap 100 mg 100 mg Oral BID   PEG 3350 (MIRALAX) powder packet 17 g 17 g Oral BID   bisacodyl (DULCOLAX) rectal suppository 10 60 65   GFRNAA 49* 53* 52* 56*   CA 8.6 8.9 8.5 8.8   ALB 3.0* 3.3*  --   --     140 139 140   K 4.3 4.1 4.2 3.9    110 109 109   CO2 22.0 23.0 23.0 24.0   ALKPHO  --  97  --   --    AST  --  30  --   --    ALT  --  45  --   --    BILT  --  0.3 NSAIDs on discharge -discussed with patient repeatedly      2. NSTEMI  - s/p LAD stent on 5/31  - started on brilinta  asa and BB  - s/p angiogram on 5/29 - MVD .  Refusing surgery  - angiogram with possible PCI today     Discussed with Nursing     Daniel Gentile

## 2019-06-01 NOTE — PROGRESS NOTES
Went over discharge paperwork, mi and cath education. Prescriptions handed to patient, all questions answered. Home medications handed to patients son (ok per patient to have son hold and take down).  Went over appointments with patient, about cardiac rehab Detail Level: Simple Include Location In Plan?: No

## 2019-06-01 NOTE — PROGRESS NOTES
[facility] - [facility]    [facility]iology - [facility]  Progress Note    Mela Misa Patient Status:  Inpatient    10/1/1960 MRN [id number]   Location [facility] 3W/SW Attending Raymond Hyatt MD   Hosp Day # 7 PCP PHYSICIAN NONSTAFF       Assessm cooperative, calm    Results:     Lab Results   Component Value Date    WBC 4.7 05/31/2019    HGB 11.4 (L) 05/31/2019    HCT 34.2 (L) 05/31/2019    .0 05/31/2019    CREATSERUM 1.39 (H) 06/01/2019    BUN 16 06/01/2019     06/01/2019    K 3.9 06

## 2019-06-01 NOTE — PHYSICAL THERAPY NOTE
PHYSICAL THERAPY EVALUATION - INPATIENT     Room Number: 332/332-A  Evaluation Date: 6/1/2019  Type of Evaluation: Initial   Physician Order: PT Eval and Treat    Presenting Problem: cad, s/p stent placement(history of right cva)  Reason for Therapy: Mobi Urinary retention      Past Medical History  Past Medical History:   Diagnosis Date   • Atherosclerosis of coronary artery    • Back problem    • Calculus of kidney    • Coronary atherosclerosis    • Disorder of liver     \"enlarged\"   • Disorder of thyro Sitting: Good  Dynamic Sitting: Good  Static Standing: Fair -  Dynamic Standing: Poor +    ADDITIONAL TESTS  Additional Tests: None           NEUROLOGICAL FINDINGS  Neurological Findings: Sensation           Sensation: intact aleshia le to light touch       AC sit EOB @ level: independent     Goal #1   Current Status    Goal #2 Patient is able to demonstrate transfers Sit to/from Stand at assistance level: modified independent with walker - rolling     Goal #2  Current Status    Goal #3 Patient is able to [facility]ELAYA [facility]

## 2019-06-02 NOTE — DISCHARGE SUMMARY
[facility]    Discharge Summary    Beatris Sarah Beth Patient Status:  Inpatient    10/1/1960 MRN [id number]   Location [facility] 3W/SW Attending No att. providers found   Hosp Day # 7 PCP PHYSICIAN NONSTAFF     Date of Admission:  showing multi-vessel CAD. CABG recommended however pt refusing. Pt went for repeat cath 5/31 and had 2 stents placed.   - exertional CP and elevated troponin on admission  - EKG reviewed  - was on heparin drip  Pt on aspirin and brilinta on dc.  - cont times daily. Quantity:  60 tablet  Refills:  11        CONTINUE taking these medications      Instructions Prescription details   FLUoxetine HCl 20 MG Caps  Commonly known as:  PROZAC      Take 20 mg by mouth daily.    Refills:  0     Levothyroxine Sodium Risk  0-28   Low Risk. TCM Follow-Up Recommendation:  LACE > 58: High Risk of readmission after discharge from the hospital.      >35 minutes spent preparing this discharge.     Felton Anderson  6/1/2019  8:13 PM

## 2019-06-03 ENCOUNTER — TELEPHONE (OUTPATIENT)
Dept: CASE MANAGEMENT | Age: 59
End: 2019-06-03

## 2019-06-03 NOTE — PAYOR COMM NOTE
--------------  DISCHARGE REVIEW    Payor: Dustin Christensen #:  [id number]  Authorization Number: [id number]    Admit date: 5/25/19  Admit time:  2309  Discharge Date: 6/1/2019  5:34 PM     Admitting Physician: Johnson Bowman arm)   Pulse 62   Temp 98.3 °F (36.8 °C) (Oral)   Resp 16   Ht 5' 10.8\" (1.798 m)   Wt 224 lb 6.4 oz (101.8 kg)   SpO2 98%   BMI 31.47 kg/m²      Gen:  NAD. A and O x  3  CV:   RRR.   No m/g/r  Pulm:   CTA bilat  Abd:   +bs, soft, NT, ND  LE:  No c/c/e  N Take 1 tablet (5 mg total) by mouth daily. Quantity:  30 tablet  Refills:  1     Isosorbide Mononitrate ER 30 MG Tb24  Commonly known as:  IMDUR  Start taking on:  6/2/2019      Take 1 tablet (30 mg total) by mouth daily.    Quantity:  30 tablet  Refil HCl 0.4 MG Caps  · ticagrelor 90 MG Tabs         Follow up Visits: Follow-up Information     Misty Fuller MD In 2 weeks. Specialty:  Franchester Burkitt, MD In 2 weeks.     Specialty[facility]  [facility].

## 2019-06-07 PROBLEM — Z95.5 PRESENCE OF CORONARY ANGIOPLASTY IMPLANT AND GRAFT: Status: ACTIVE | Noted: 2019-06-07

## 2019-06-07 PROBLEM — N18.2 CKD (CHRONIC KIDNEY DISEASE) STAGE 2, GFR 60-89 ML/MIN: Status: ACTIVE | Noted: 2017-09-18

## 2019-06-07 PROBLEM — I21.4 NON-ST ELEVATION (NSTEMI) MYOCARDIAL INFARCTION (CMD): Status: ACTIVE | Noted: 2019-06-07

## 2019-07-17 ENCOUNTER — TELEPHONE (OUTPATIENT)
Dept: CARDIOLOGY | Age: 59
End: 2019-07-17

## 2019-08-20 ENCOUNTER — TELEPHONE (OUTPATIENT)
Dept: CARDIOLOGY CLINIC | Facility: CLINIC | Age: 59
End: 2019-08-20

## 2019-08-20 NOTE — TELEPHONE ENCOUNTER
Transferred call from [place]. Patient never seen at [facility]. Was seen inpatient for CAD, Cath w/Stent placement 5/31/19. Discharged 6/1/19. Provided with ICD10 code for CAD and Cardiac cath with stent placement.     Pieter

## 2019-08-21 ENCOUNTER — MED REC SCAN ONLY (OUTPATIENT)
Dept: CARDIOLOGY CLINIC | Facility: CLINIC | Age: 59
End: 2019-08-21

## 2019-10-30 RX ORDER — ASPIRIN 81 MG/1
TABLET, COATED ORAL
Qty: 30 TABLET | Refills: 0 | OUTPATIENT
Start: 2019-10-30

## 2019-10-30 RX ORDER — ISOSORBIDE MONONITRATE 30 MG/1
TABLET, EXTENDED RELEASE ORAL
Qty: 30 TABLET | Refills: 0 | OUTPATIENT
Start: 2019-10-30

## 2019-10-31 NOTE — PLAN OF CARE
Problem: CARDIOVASCULAR - ADULT  Goal: Maintains optimal cardiac output and hemodynamic stability  INTERVENTIONS:  - Monitor vital signs, rhythm, and trends  - Monitor for bleeding, hypotension and signs of decreased cardiac output  - Evaluate effectivenes 3 4

## 2020-06-17 ENCOUNTER — APPOINTMENT (OUTPATIENT)
Dept: CT IMAGING | Facility: HOSPITAL | Age: 60
DRG: 229 | End: 2020-06-17
Attending: EMERGENCY MEDICINE
Payer: MEDICAID

## 2020-06-17 ENCOUNTER — APPOINTMENT (OUTPATIENT)
Dept: GENERAL RADIOLOGY | Facility: HOSPITAL | Age: 60
DRG: 229 | End: 2020-06-17
Payer: MEDICAID

## 2020-06-17 ENCOUNTER — HOSPITAL ENCOUNTER (INPATIENT)
Facility: HOSPITAL | Age: 60
LOS: 5 days | Discharge: NURSING, REHAB OR LONG-TERM CARE | DRG: 229 | End: 2020-06-24
Admitting: HOSPITALIST
Payer: MEDICAID

## 2020-06-17 DIAGNOSIS — R07.9 ACUTE CHEST PAIN: Primary | ICD-10-CM

## 2020-06-17 PROCEDURE — 74176 CT ABD & PELVIS W/O CONTRAST: CPT | Performed by: EMERGENCY MEDICINE

## 2020-06-17 PROCEDURE — 99220 INITIAL OBSERVATION CARE,LEVL III: CPT | Performed by: HOSPITALIST

## 2020-06-17 PROCEDURE — 71045 X-RAY EXAM CHEST 1 VIEW: CPT

## 2020-06-17 PROCEDURE — 70450 CT HEAD/BRAIN W/O DYE: CPT | Performed by: EMERGENCY MEDICINE

## 2020-06-17 RX ORDER — MORPHINE SULFATE 4 MG/ML
4 INJECTION, SOLUTION INTRAMUSCULAR; INTRAVENOUS EVERY 2 HOUR PRN
Status: DISCONTINUED | OUTPATIENT
Start: 2020-06-17 | End: 2020-06-21

## 2020-06-17 RX ORDER — ASPIRIN 325 MG
325 TABLET ORAL NIGHTLY
Status: DISCONTINUED | OUTPATIENT
Start: 2020-06-17 | End: 2020-06-19

## 2020-06-17 RX ORDER — POLYETHYLENE GLYCOL 3350 17 G/17G
17 POWDER, FOR SOLUTION ORAL DAILY PRN
Status: DISCONTINUED | OUTPATIENT
Start: 2020-06-17 | End: 2020-06-21 | Stop reason: DRUGHIGH

## 2020-06-17 RX ORDER — SODIUM CHLORIDE 0.9 % (FLUSH) 0.9 %
3 SYRINGE (ML) INJECTION AS NEEDED
Status: DISCONTINUED | OUTPATIENT
Start: 2020-06-17 | End: 2020-06-24

## 2020-06-17 RX ORDER — LABETALOL HYDROCHLORIDE 5 MG/ML
10 INJECTION, SOLUTION INTRAVENOUS ONCE
Status: COMPLETED | OUTPATIENT
Start: 2020-06-17 | End: 2020-06-17

## 2020-06-17 RX ORDER — DIPHENHYDRAMINE HYDROCHLORIDE 50 MG/ML
50 INJECTION INTRAMUSCULAR; INTRAVENOUS ONCE
Status: DISCONTINUED | OUTPATIENT
Start: 2020-06-17 | End: 2020-06-17

## 2020-06-17 RX ORDER — METOCLOPRAMIDE HYDROCHLORIDE 5 MG/ML
10 INJECTION INTRAMUSCULAR; INTRAVENOUS EVERY 8 HOURS PRN
Status: DISCONTINUED | OUTPATIENT
Start: 2020-06-17 | End: 2020-06-20

## 2020-06-17 RX ORDER — ONDANSETRON 2 MG/ML
4 INJECTION INTRAMUSCULAR; INTRAVENOUS EVERY 6 HOURS PRN
Status: DISCONTINUED | OUTPATIENT
Start: 2020-06-17 | End: 2020-06-24

## 2020-06-17 RX ORDER — ACETAMINOPHEN 500 MG
1000 TABLET ORAL ONCE
Status: DISCONTINUED | OUTPATIENT
Start: 2020-06-17 | End: 2020-06-24

## 2020-06-17 RX ORDER — SODIUM PHOSPHATE, DIBASIC AND SODIUM PHOSPHATE, MONOBASIC 7; 19 G/133ML; G/133ML
1 ENEMA RECTAL ONCE AS NEEDED
Status: COMPLETED | OUTPATIENT
Start: 2020-06-17 | End: 2020-06-18

## 2020-06-17 RX ORDER — HYDROCODONE BITARTRATE AND ACETAMINOPHEN 5; 325 MG/1; MG/1
1 TABLET ORAL ONCE
Status: COMPLETED | OUTPATIENT
Start: 2020-06-17 | End: 2020-06-17

## 2020-06-17 RX ORDER — DOCUSATE SODIUM 100 MG/1
100 CAPSULE, LIQUID FILLED ORAL 2 TIMES DAILY
Status: DISCONTINUED | OUTPATIENT
Start: 2020-06-17 | End: 2020-06-18

## 2020-06-17 RX ORDER — DEXTROSE MONOHYDRATE 25 G/50ML
50 INJECTION, SOLUTION INTRAVENOUS
Status: DISCONTINUED | OUTPATIENT
Start: 2020-06-17 | End: 2020-06-24

## 2020-06-17 RX ORDER — NITROGLYCERIN 0.4 MG/1
0.4 TABLET SUBLINGUAL EVERY 5 MIN PRN
Status: DISCONTINUED | OUTPATIENT
Start: 2020-06-17 | End: 2020-06-24

## 2020-06-17 RX ORDER — BISACODYL 10 MG
10 SUPPOSITORY, RECTAL RECTAL
Status: DISCONTINUED | OUTPATIENT
Start: 2020-06-17 | End: 2020-06-24

## 2020-06-17 RX ORDER — LORAZEPAM 2 MG/ML
2 INJECTION INTRAMUSCULAR ONCE
Status: DISCONTINUED | OUTPATIENT
Start: 2020-06-17 | End: 2020-06-17

## 2020-06-17 RX ORDER — MORPHINE SULFATE 2 MG/ML
2 INJECTION, SOLUTION INTRAMUSCULAR; INTRAVENOUS EVERY 2 HOUR PRN
Status: DISCONTINUED | OUTPATIENT
Start: 2020-06-17 | End: 2020-06-21

## 2020-06-17 RX ORDER — MORPHINE SULFATE 2 MG/ML
1 INJECTION, SOLUTION INTRAMUSCULAR; INTRAVENOUS EVERY 2 HOUR PRN
Status: DISCONTINUED | OUTPATIENT
Start: 2020-06-17 | End: 2020-06-21

## 2020-06-17 NOTE — ED NOTES
Spoke with Lorna, patients wife. Okay to give update per patient. Update given. Verbalized understanding.

## 2020-06-17 NOTE — CONSULTS
[facility]    PATIENT'S NAME: Guru Vang   ATTENDING PHYSICIAN: Linda Molina MD   CONSULTING PHYSICIAN: Kath Omalley.  Praful Birch MD   PATIENT ACCOUNT#:   [id number]    LOCATION:  [place] [address]  MEDICAL RECORD #:   [id number]       DATE OF BIRTH: Afebrile, saturation 97%. HEENT:  Unremarkable. NECK:  Supple. Jugular venous pressure normal.  Carotids brisk without bruits. No thyromegaly or adenopathy. LUNGS:  Clear. HEART:  S1, S2 normal.  No gallop, murmur, rub, or click.   ABDOMEN:  Tender, p

## 2020-06-17 NOTE — ED NOTES
Patient here with chest pain that started yesterday. Patient states it is in the center of his chest. Patient denies any radiation. Denies SOB or dizziness. Denies nausea or vomiting.

## 2020-06-17 NOTE — CONSULTS
Cardiology (consult dictated)    Assessment:  1. Chest pain. Initial troponin normal.  Abnormal EKG but changes are chronic. 2.  Known CAD. Status post PCI of LAD and circumflex 2019. Previous stenting of ramus at [facility], that vessel occluded.   Hist

## 2020-06-17 NOTE — ED PROVIDER NOTES
Patient Seen in: [facility] Emergency Department      History   No chief complaint on file. Stated Complaint: Chest Pain    HPI    53-year-old male with multiple medical problems presents for evaluation of chest pain.   Patient admitted for non- and vital signs reviewed. All other systems reviewed and negative except as noted above.     Physical Exam     ED Triage Vitals [06/17/20 1342]   BP (!) 155/110   Pulse 84   Resp 16   Temp 98.3 °F (36.8 °C)   Temp src Oral   SpO2 95 %   O2 Device Non-r Abnormality         Status                     ---------                               -----------         ------                     CBC W/ DIFFERENTIAL[[id number]]                              Final result                 Please view results for these mahesh abnormal enlargement. KIDNEYS: Large left renal parapelvic cyst measuring 5.2 x 5.4 cm. No     kidney stone or hydronephrosis. AORTA/VASCULAR:   Normal.  No aneurysm. RETROPERITONEUM: No mass or enlarged adenopathy.       BOWEL/MESENTERY:  Red rectosigmoid with moderate circumferential     thickening of the rectal wall with mild to moderate injection of the     perirectal fat. I can not exclude a proctitis. There is fecal rectal     impaction. Correlate clinically.     3. Small bilateral ingui SINUSES: Limited views demonstrate no significant mucosal thickening or     fluid. ORBITS: Limited views are unremarkable. OTHER: Atherosclerotic calcification cavernous carotid arteries.                        =====    CONCLUSION:     1.  No injection 10 mg (10 mg Intravenous Given 6/17/20 1554)   HYDROcodone-acetaminophen (NORCO) 5-325 MG per tab 1 tablet (1 tablet Oral Given 6/17/20 1849)          06/17/20  1800 06/17/20  1815 06/17/20  1830 06/17/20  1845   BP: (!) 161/107 (!) 154/101 (!) 1 associated acute management issues with the patient, and I explained the need for further follow-up evaluation and treatment.       Condition upon disposition: Stable                Disposition and Plan     Clinical Impression:  Acute chest pain  (primary e

## 2020-06-17 NOTE — H&P
[facility]    PATIENT'S NAME: Eliu Cooper   ATTENDING PHYSICIAN: Ariel Huddleston MD   PATIENT ACCOUNT#:   [id number]    LOCATION:  [place] [address]  MEDICAL RECORD #:   [id number]       YOB: 1960  ADMISSION DATE:       06/17/202 lithotripsy, inguinal hernia repair. MEDICATIONS:  Please see medication reconciliation list.      FAMILY HISTORY:  Positive for coronary artery disease. SOCIAL HISTORY:  Smokes 1/2 pack a day. No alcohol or drug use. Retired, on disability.   Lives mellitus type 2.  5.   Hypertension. PLAN:  Patient will be admitted to telemetry floor for observation. Monitor his troponins and obtain Lexiscan stress test in the morning and 2D echocardiogram with Doppler.   Follow up on CT scan of the abdomen to ru

## 2020-06-17 NOTE — ED NOTES
Orders for admission, patient is aware of plan and ready to go upstairs.  Any questions, please call ED RN Jenni  at [address]

## 2020-06-18 ENCOUNTER — APPOINTMENT (OUTPATIENT)
Dept: NUCLEAR MEDICINE | Facility: HOSPITAL | Age: 60
DRG: 229 | End: 2020-06-18
Attending: HOSPITALIST
Payer: MEDICAID

## 2020-06-18 ENCOUNTER — APPOINTMENT (OUTPATIENT)
Dept: CV DIAGNOSTICS | Facility: HOSPITAL | Age: 60
DRG: 229 | End: 2020-06-18
Attending: HOSPITALIST
Payer: MEDICAID

## 2020-06-18 PROCEDURE — 93306 TTE W/DOPPLER COMPLETE: CPT | Performed by: HOSPITALIST

## 2020-06-18 PROCEDURE — 93017 CV STRESS TEST TRACING ONLY: CPT | Performed by: HOSPITALIST

## 2020-06-18 PROCEDURE — 78452 HT MUSCLE IMAGE SPECT MULT: CPT | Performed by: HOSPITALIST

## 2020-06-18 PROCEDURE — 99226 SUBSEQUENT OBSERVATION CARE: CPT | Performed by: HOSPITALIST

## 2020-06-18 RX ORDER — LEVOTHYROXINE SODIUM 0.1 MG/1
200 TABLET ORAL
Status: DISCONTINUED | OUTPATIENT
Start: 2020-06-18 | End: 2020-06-24

## 2020-06-18 RX ORDER — ATORVASTATIN CALCIUM 40 MG/1
40 TABLET, FILM COATED ORAL NIGHTLY
Status: DISCONTINUED | OUTPATIENT
Start: 2020-06-18 | End: 2020-06-24

## 2020-06-18 RX ORDER — METOPROLOL SUCCINATE 100 MG/1
100 TABLET, EXTENDED RELEASE ORAL DAILY
Status: DISCONTINUED | OUTPATIENT
Start: 2020-06-18 | End: 2020-06-20

## 2020-06-18 RX ORDER — CHLORHEXIDINE GLUCONATE 4 G/100ML
30 SOLUTION TOPICAL
Status: COMPLETED | OUTPATIENT
Start: 2020-06-19 | End: 2020-06-19

## 2020-06-18 RX ORDER — SODIUM CHLORIDE 9 MG/ML
INJECTION, SOLUTION INTRAVENOUS
Status: COMPLETED | OUTPATIENT
Start: 2020-06-19 | End: 2020-06-19

## 2020-06-18 RX ORDER — HYDRALAZINE HYDROCHLORIDE 25 MG/1
25 TABLET, FILM COATED ORAL EVERY 8 HOURS SCHEDULED
Status: DISCONTINUED | OUTPATIENT
Start: 2020-06-18 | End: 2020-06-21

## 2020-06-18 RX ORDER — SENNA AND DOCUSATE SODIUM 50; 8.6 MG/1; MG/1
2 TABLET, FILM COATED ORAL DAILY
Status: DISCONTINUED | OUTPATIENT
Start: 2020-06-18 | End: 2020-06-24

## 2020-06-18 RX ORDER — NICOTINE 21 MG/24HR
1 PATCH, TRANSDERMAL 24 HOURS TRANSDERMAL EVERY 24 HOURS
Status: DISCONTINUED | OUTPATIENT
Start: 2020-06-18 | End: 2020-06-24

## 2020-06-18 RX ORDER — HEPARIN SODIUM 5000 [USP'U]/ML
5000 INJECTION, SOLUTION INTRAVENOUS; SUBCUTANEOUS EVERY 12 HOURS SCHEDULED
Status: DISCONTINUED | OUTPATIENT
Start: 2020-06-18 | End: 2020-06-24

## 2020-06-18 NOTE — PROGRESS NOTES
[facility]    Progress Note    Antonio Watt Patient Status:  Observation    10/1/1960 MRN [id number]   Location [facility]5W Attending Roosvelt Habermann, MD   Hosp Day # 0 PCP PHYSICIAN NONSTAFF       Subjective:   Antonio Watt is On Call   • Senna-Docusate Sodium  2 tablet Oral Daily   • acetaminophen  1,000 mg Oral Once   • Insulin Aspart Pen  1-7 Units Subcutaneous TID CC   • aspirin  325 mg Oral Nightly       Current PRN Inpatient Meds:      Normal Saline Flush, morphINE sulfate and cerebellar atrophy. 5. Atherosclerotic calcification cavernous carotid arteries.     Dictated by (CST): Elouise Pallas, MD on 6/17/2020 at 4:39 PM     Finalized by (CST): Elouise Pallas, MD on 6/17/2020 at 4:43 PM          Ct Abdomen+pelvis(cpt=74176) ECG of 05/25/2019 21:58:46 No significant changes have occurred Electronically signed on 06/17/2020 at 15:48 by Antoni Kemp MD      Lab Results   Component Value Date    DDIMER 0.99 (H) 08/23/2018    CRP 0.41 07/23/2018       Assessment and Plan:     Alex Hendrix

## 2020-06-18 NOTE — PROGRESS NOTES
[facility]  Progress Note    Danitza Sal Patient Status:  Observation    10/1/1960 MRN [id number]   Location [facility]5W Attending Meaghan Dorantes MD   Hosp Day # 0 PCP PHYSICIAN NONSTAFF     Assessment:    1. Chest pain.   Negative t Extremities: Without clubbing, cyanosis or edema. Peripheral pulses are 2+. Skin: Warm and dry.      Labs:  Lab Results   Component Value Date    WBC 8.1 06/18/2020    HGB 14.5 06/18/2020    HCT 42.1 06/18/2020    .0 06/18/2020     Lab Results

## 2020-06-18 NOTE — PLAN OF CARE
Pt received awake and alert. C/o pain to mid abdomen. Morphine IV given with some relief. Still needs to have BM. Miralax, MOM, and colace given. Encouraged to get up and move around. Pt with 1 episode of emesis. Plan for cardiac cath tomorrow.  NPO after m

## 2020-06-18 NOTE — PAYOR COMM NOTE
--------------  ADMISSION REVIEW     Payor: Dustin Christensen #:  [id number]  Authorization Number: [id number]    [facility] date:currently Observation stay       Admitting Physician: Kaylan Gutierres MD  Attending Physician: TRANSLUMINAL CORONARY ANGIOPLASTY     • HERNIA SURGERY  2012   • KIDNEY SURGERY      for stone   • REPAIR ING HERNIA,5+Y/O,REDUCIBL     • STENT PL SINGLE VES      2 cardiac stents x 2 2006 2x in 2014 3x in 2017                    Social History    Tobacco Neurological:      General: No focal deficit present. Mental Status: He is alert and oriented to person, place, and time.                ED Course     Labs Reviewed   BASIC METABOLIC PANEL (8) - Abnormal; Notable for the following components:       R size. Use of iterative     reconstruction technique for dose reduction was used. Dose information is     transmitted to the [employer] [facility] [employer]) NRDR ([address]) which includes the Dose Index Registry.          FIN visible mass. Pelvic organs appropriate for patient     age. BONES:   No significant bony lesion or fracture. LUNG BASES: No visible pulmonary or pleural disease. OTHER: Partially visualized extensive coronary vascular calcification.       [employer] Dose Index Registry. FINDINGS:     CSF SPACES: Prominence of ventricles and sulci. No hydrocephalus,     subarachnoid hemorrhage, or mass. No midline shift. CEREBRUM: Chronic right basal ganglionic and left subinsular lacunar     infarcts. adenopathy. LUNGS/PLEURA: No significant pulmonary parenchymal abnormalities. Apical     pleural thickening is seen bilaterally. BONES: No fracture or visible bony lesion. OTHER: Negative.                          =====    CONCLUSION:     1.  Aleksandra Discussed with Dr. Brittany Rose, no further recommendations. Discussed with and admitted to Dr. Mac Abraham.      Impression:  After review and interpretation of the above emergency department workup, the patient was found to have Acute chest pain  (primary encount around his baseline, BUN 25 and creatinine 1.53, glucose 211. CT scan of the brain showed no acute findings. Chest x-ray showed no acute findings. EKG showed normal sinus rhythm with no acute ST-T changes. Troponin, first set, was negative.   Again, EKG VITAL SIGNS:  Temperature 98.3, pulse 76, respiratory rate 16, blood pressure 136/94, pulse ox 97% on room air. HEENT:  Atraumatic. Oropharynx clear. Dry mucous membranes. Normal hard and soft palate. Eyes:  Anicteric sclerae.   Pupils equal, round, HYDROcodone-acetaminophen (NORCO) 5-325 MG per tab 1 tablet     Date Action Dose Route User    6/17/2020 1849 Given 1 tablet Oral Mitch Solo, RN      Labetalol HCl (TRANDATE) injection 10 mg     Date Action Dose Route User    6/17/2020 1554 Given

## 2020-06-19 ENCOUNTER — APPOINTMENT (OUTPATIENT)
Dept: INTERVENTIONAL RADIOLOGY/VASCULAR | Facility: HOSPITAL | Age: 60
DRG: 229 | End: 2020-06-19
Attending: INTERNAL MEDICINE
Payer: MEDICAID

## 2020-06-19 PROBLEM — R10.9 INTRACTABLE ABDOMINAL PAIN: Status: ACTIVE | Noted: 2020-06-19

## 2020-06-19 PROCEDURE — 027107Z DILATION OF CORONARY ARTERY, TWO ARTERIES WITH FOUR OR MORE DRUG-ELUTING INTRALUMINAL DEVICES, OPEN APPROACH: ICD-10-PCS | Performed by: INTERNAL MEDICINE

## 2020-06-19 PROCEDURE — 4A023N7 MEASUREMENT OF CARDIAC SAMPLING AND PRESSURE, LEFT HEART, PERCUTANEOUS APPROACH: ICD-10-PCS | Performed by: INTERNAL MEDICINE

## 2020-06-19 PROCEDURE — B2151ZZ FLUOROSCOPY OF LEFT HEART USING LOW OSMOLAR CONTRAST: ICD-10-PCS | Performed by: INTERNAL MEDICINE

## 2020-06-19 PROCEDURE — B2111ZZ FLUOROSCOPY OF MULTIPLE CORONARY ARTERIES USING LOW OSMOLAR CONTRAST: ICD-10-PCS | Performed by: INTERNAL MEDICINE

## 2020-06-19 RX ORDER — MORPHINE SULFATE 2 MG/ML
INJECTION, SOLUTION INTRAMUSCULAR; INTRAVENOUS
Status: DISCONTINUED
Start: 2020-06-19 | End: 2020-06-19 | Stop reason: WASHOUT

## 2020-06-19 RX ORDER — ISOSORBIDE MONONITRATE 30 MG/1
30 TABLET, EXTENDED RELEASE ORAL DAILY
Status: DISCONTINUED | OUTPATIENT
Start: 2020-06-19 | End: 2020-06-23

## 2020-06-19 RX ORDER — MORPHINE SULFATE 2 MG/ML
INJECTION, SOLUTION INTRAMUSCULAR; INTRAVENOUS
Status: DISPENSED
Start: 2020-06-19 | End: 2020-06-20

## 2020-06-19 RX ORDER — FUROSEMIDE 10 MG/ML
60 INJECTION INTRAMUSCULAR; INTRAVENOUS ONCE
Status: COMPLETED | OUTPATIENT
Start: 2020-06-19 | End: 2020-06-19

## 2020-06-19 RX ORDER — LIDOCAINE HYDROCHLORIDE 20 MG/ML
INJECTION, SOLUTION EPIDURAL; INFILTRATION; INTRACAUDAL; PERINEURAL
Status: COMPLETED
Start: 2020-06-19 | End: 2020-06-19

## 2020-06-19 RX ORDER — IPRATROPIUM BROMIDE AND ALBUTEROL SULFATE 2.5; .5 MG/3ML; MG/3ML
SOLUTION RESPIRATORY (INHALATION)
Status: COMPLETED
Start: 2020-06-19 | End: 2020-06-19

## 2020-06-19 RX ORDER — IPRATROPIUM BROMIDE AND ALBUTEROL SULFATE 2.5; .5 MG/3ML; MG/3ML
3 SOLUTION RESPIRATORY (INHALATION) ONCE
Status: DISCONTINUED | OUTPATIENT
Start: 2020-06-19 | End: 2020-06-24

## 2020-06-19 RX ORDER — MIDAZOLAM HYDROCHLORIDE 1 MG/ML
INJECTION INTRAMUSCULAR; INTRAVENOUS
Status: COMPLETED
Start: 2020-06-19 | End: 2020-06-19

## 2020-06-19 RX ORDER — FUROSEMIDE 10 MG/ML
INJECTION INTRAMUSCULAR; INTRAVENOUS
Status: COMPLETED
Start: 2020-06-19 | End: 2020-06-19

## 2020-06-19 RX ORDER — HEPARIN SODIUM 1000 [USP'U]/ML
INJECTION, SOLUTION INTRAVENOUS; SUBCUTANEOUS
Status: DISCONTINUED
Start: 2020-06-19 | End: 2020-06-19 | Stop reason: WASHOUT

## 2020-06-19 RX ORDER — ASPIRIN 81 MG/1
81 TABLET ORAL DAILY
Status: DISCONTINUED | OUTPATIENT
Start: 2020-06-20 | End: 2020-06-24

## 2020-06-19 RX ORDER — VERAPAMIL HYDROCHLORIDE 2.5 MG/ML
INJECTION, SOLUTION INTRAVENOUS
Status: DISCONTINUED
Start: 2020-06-19 | End: 2020-06-19 | Stop reason: WASHOUT

## 2020-06-19 RX ORDER — HEPARIN SODIUM 1000 [USP'U]/ML
INJECTION, SOLUTION INTRAVENOUS; SUBCUTANEOUS
Status: COMPLETED
Start: 2020-06-19 | End: 2020-06-19

## 2020-06-19 RX ORDER — FUROSEMIDE 10 MG/ML
INJECTION INTRAMUSCULAR; INTRAVENOUS
Status: DISPENSED
Start: 2020-06-19 | End: 2020-06-20

## 2020-06-19 RX ORDER — NITROGLYCERIN 20 MG/100ML
INJECTION INTRAVENOUS
Status: DISCONTINUED
Start: 2020-06-19 | End: 2020-06-19 | Stop reason: WASHOUT

## 2020-06-19 RX ORDER — SODIUM CHLORIDE 9 MG/ML
INJECTION, SOLUTION INTRAVENOUS CONTINUOUS
Status: ACTIVE | OUTPATIENT
Start: 2020-06-19 | End: 2020-06-20

## 2020-06-19 RX ORDER — METOPROLOL TARTRATE 5 MG/5ML
INJECTION INTRAVENOUS
Status: COMPLETED
Start: 2020-06-19 | End: 2020-06-19

## 2020-06-19 RX ORDER — MORPHINE SULFATE 2 MG/ML
INJECTION, SOLUTION INTRAMUSCULAR; INTRAVENOUS
Status: COMPLETED
Start: 2020-06-19 | End: 2020-06-19

## 2020-06-19 RX ORDER — ASPIRIN 81 MG/1
81 TABLET, CHEWABLE ORAL DAILY
Status: DISCONTINUED | OUTPATIENT
Start: 2020-06-19 | End: 2020-06-19

## 2020-06-19 NOTE — PROCEDURES
[facility] - [facility]    [facility] Cardiac Cath Procedure Note  Brenna Human Patient Status:  Observation    10/1/1960 MRN [id number]   Location [facility] Attending Agus Monet MD   Hosp Day # 0 PCP PHYSICIAN NONS wire  Pre-dil:  1.5 mm, 2.0 x 30 mm  Laser atherectomy, 3 passes at 80/80  Angioplasty:  3.0 x 30 mm balloon with in stent and across native cx to cover the ostium  After multiple balloon inflations proximal and distal to the stent it appeared that there w pressures  CHARLES on CKD - cardiorenal  HTN - uncontrolled      Recommendations:  DAPT - aspirin and brilinta, expressed need to be compliant with DAPT. Unable to switch prasugrel due to history of CVA.   Would avoid plavix due to stent burden  HFpEF - lasix

## 2020-06-19 NOTE — PROGRESS NOTES
Down getting his angiogram currently. Per RN has been asking for morphine around the clock for abdominal pain from his hernia. Had a large BM after edema.     abnormal stress test   H/O Multivessel coronary artery disease with occluded circumflex sten

## 2020-06-19 NOTE — PLAN OF CARE
Patient awake, alert. PRN Morphine given for pain 10/10 with some relief. Pt scheduled for Cath tomorrow. Pt is aware. No questions or concerns at present time. Reinforced pt to call when needed. Call light within reach. Bed alarm on.  Will continue to [facility]

## 2020-06-19 NOTE — PLAN OF CARE
Pt received awake and alert. Continues to c/o abdominal pain. Large BM yesterday. No BM overnight. Scheduled for cardiac cath today. Consent signed. Pt bathed and clipped. NPO since midnight. Educated to call for assistance. Call light within reach.      Pr

## 2020-06-20 ENCOUNTER — APPOINTMENT (OUTPATIENT)
Dept: ULTRASOUND IMAGING | Facility: HOSPITAL | Age: 60
DRG: 229 | End: 2020-06-20
Attending: NURSE PRACTITIONER
Payer: MEDICAID

## 2020-06-20 PROBLEM — K43.9 VENTRAL HERNIA WITHOUT OBSTRUCTION OR GANGRENE: Status: ACTIVE | Noted: 2020-06-20

## 2020-06-20 PROCEDURE — 93926 LOWER EXTREMITY STUDY: CPT | Performed by: NURSE PRACTITIONER

## 2020-06-20 PROCEDURE — 99254 IP/OBS CNSLTJ NEW/EST MOD 60: CPT | Performed by: SURGERY

## 2020-06-20 PROCEDURE — 99233 SBSQ HOSP IP/OBS HIGH 50: CPT | Performed by: HOSPITALIST

## 2020-06-20 RX ORDER — HYDROCODONE BITARTRATE AND ACETAMINOPHEN 5; 325 MG/1; MG/1
1 TABLET ORAL EVERY 6 HOURS PRN
Status: DISCONTINUED | OUTPATIENT
Start: 2020-06-20 | End: 2020-06-24

## 2020-06-20 RX ORDER — METOPROLOL SUCCINATE 50 MG/1
50 TABLET, EXTENDED RELEASE ORAL ONCE
Status: DISCONTINUED | OUTPATIENT
Start: 2020-06-20 | End: 2020-06-20

## 2020-06-20 RX ORDER — METOPROLOL SUCCINATE 50 MG/1
50 TABLET, EXTENDED RELEASE ORAL
Status: DISCONTINUED | OUTPATIENT
Start: 2020-06-21 | End: 2020-06-21

## 2020-06-20 RX ORDER — METOCLOPRAMIDE HYDROCHLORIDE 5 MG/ML
5 INJECTION INTRAMUSCULAR; INTRAVENOUS EVERY 8 HOURS PRN
Status: DISCONTINUED | OUTPATIENT
Start: 2020-06-20 | End: 2020-06-24

## 2020-06-20 RX ORDER — METOPROLOL SUCCINATE 50 MG/1
50 TABLET, EXTENDED RELEASE ORAL
Status: DISCONTINUED | OUTPATIENT
Start: 2020-06-20 | End: 2020-06-20

## 2020-06-20 NOTE — PLAN OF CARE
Patient awake and alert. Lying in bed comfortably. Return to the floor post cath. Pt denies pain. Had 1 episode of emeses. PRN Zofran given. Cath site with dressing. Intact, tenderness, swelling and bruising. Offered fluids. Reinforced to call for help.

## 2020-06-20 NOTE — CONSULTS
[facility] HOSP - [facility]    Report of Consultation    Aminata Edmond Patient Status:  Observation    10/1/1960 MRN [id number]   Location [facility]5W Attending Charmayne Cranker, MD   Hosp Day # 0 PCP PHYSICIAN NONSTAFF     Date of Admission:   2 cardiac stents x 2 2006 2x in 2014 3x in 2017       Family History  Family History   Problem Relation Age of Onset   • Heart Disorder Father    • Lipids Father    • Diabetes Paternal Grandmother    • Heart Disorder Paternal Grandfather    • Diabetes Mason morphINE sulfate (PF) 2 MG/ML injection 2 mg, 2 mg, Intravenous, Q2H PRN    Or  morphINE sulfate (PF) 4 MG/ML injection 4 mg, 4 mg, Intravenous, Q2H PRN  PEG 3350 (MIRALAX) powder packet 17 g, 17 g, Oral, Daily PRN  magnesium hydroxide (MILK OF MAGNESIA) 4 Penicillins             HIVES, SHORTNESS OF BREATH    Review of Systems:     Constitutional: Negative. HENT: Negative. Eyes: Negative. Respiratory: Negative. Cardiovascular: Positive for chest pain.    Gastrointestinal: Positive for abdominal pa MG 2.4 05/27/2019    PHOS 3.7 05/29/2019    TROP <0.045 06/17/2020         Imaging:           Impression:     Acute chest pain    Ventral hernia without obstruction or gangrene  -recurrent mildly symptomatic reducible ventral hernia containing the transve

## 2020-06-20 NOTE — PROGRESS NOTES
Critical access hospital Pharmacy Note:  Renal Dose Adjustment for Metoclopramide (REGLAN)    Edi Prestons has been prescribed Metoclopramide (REGLAN) 10 mg every 8 hours as needed for n/v.    Estimated Creatinine Clearance: 37.9 mL/min (A) (based on SCr of 2.15 mg/dL (H)).

## 2020-06-20 NOTE — PROGRESS NOTES
[facility] HOSP - [facility]    Progress Note    Estefany Pablo Patient Status:  Observation    10/1/1960 MRN [id number]   Location [facility]5W Attending Nick Villegas MD   Hosp Day # 0 PCP PHYSICIAN NONSTAFF        Subjective:     Respirator Normal arterial and venous flow. 3cm L groin hematoma present.         Results:     Lab Results   Component Value Date    WBC 12.3 (H) 06/20/2020    HGB 13.4 06/20/2020    HCT 39.9 06/20/2020    .0 06/20/2020    CREATSERUM 1.68 (H) 06/19/2020    BUN

## 2020-06-20 NOTE — PROGRESS NOTES
[facility] HOSP - [facility]    Progress Note    Israel Morgan Patient Status:  Observation    10/1/1960 MRN [id number]   Location [facility]5W Attending Yahir Torres MD   Hosp Day # 0 PCP PHYSICIAN NONSTAFF       Subjective:   Israel Morgan is Daily   • ipratropium-albuterol  3 mL Nebulization Once   • Levothyroxine Sodium  200 mcg Oral Before breakfast   • hydrALAzine HCl  25 mg Oral Q8H [address]   • Senna-Docusate Sodium  2 tablet Oral Daily   • atorvastatin  40 mg Oral Nightly   • Heparin Sodium ( Culture:  No results found for this visit on 06/17/20. Imaging/EKG:   Ct Brain Or Head (59858)    Result Date: 6/17/2020  CONCLUSION:  1. No acute intracranial finding.  2. Chronic right basal ganglionic and left subinsular lacunar infarcts related Lab Results   Component Value Date    DDIMER 0.99 (H) 08/23/2018    CRP 0.41 07/23/2018       Assessment and Plan:   Abnormal stress test   H/O Multivessel coronary artery disease with occluded circumflex stent  100% RCA with collaterals,

## 2020-06-20 NOTE — PROGRESS NOTES
Post procedure report given by Lisa RN, revealed patient had hematoma to left groin site. On arrival to unit site is swollen, tender and bruised but soft. Patient currently on bedrest. Blood pressure remains stable, will continue to monitor patient.

## 2020-06-20 NOTE — PLAN OF CARE
Pt alert and oriented but very irritable and demanding. Pt refused to enter his room due to it \"smelling like corona virus and chemicals. \" Pt was educated that all rooms are properly cleaned after each patient.  Pt demanded this RN call all his doctors to intake and initiate nutrition consult as needed  - Instruct patient on self management of diabetes  Outcome: Progressing     Problem: PAIN - ADULT  Goal: Verbalizes/displays adequate comfort level or patient's stated pain goal  Description  INTERVENTIONS: patient/family/discharge partner in discharge planning  - Arrange for needed discharge resources and transportation as appropriate  - Identify discharge learning needs (meds, wound care, etc)  - Arrange for interpreters to assist at discharge as needed  -

## 2020-06-20 NOTE — IVS NOTE
Salvatore Stout  [id number]  6/19/2020    Post procedure/ recovery hand-off report given to [id number] Shakira Harden was updated on tranfer. Patient's vital signs stable, access site dry and intact, no signs and symptoms of bleeding/ hematoma.  Site was bruised and

## 2020-06-21 ENCOUNTER — APPOINTMENT (OUTPATIENT)
Dept: GENERAL RADIOLOGY | Facility: HOSPITAL | Age: 60
DRG: 229 | End: 2020-06-21
Attending: HOSPITALIST
Payer: MEDICAID

## 2020-06-21 ENCOUNTER — APPOINTMENT (OUTPATIENT)
Dept: CT IMAGING | Facility: HOSPITAL | Age: 60
DRG: 229 | End: 2020-06-21
Attending: HOSPITALIST
Payer: MEDICAID

## 2020-06-21 PROCEDURE — 71045 X-RAY EXAM CHEST 1 VIEW: CPT | Performed by: HOSPITALIST

## 2020-06-21 PROCEDURE — 99232 SBSQ HOSP IP/OBS MODERATE 35: CPT | Performed by: SURGERY

## 2020-06-21 PROCEDURE — 74176 CT ABD & PELVIS W/O CONTRAST: CPT | Performed by: HOSPITALIST

## 2020-06-21 PROCEDURE — 99233 SBSQ HOSP IP/OBS HIGH 50: CPT | Performed by: HOSPITALIST

## 2020-06-21 PROCEDURE — 99255 IP/OBS CONSLTJ NEW/EST HI 80: CPT | Performed by: INTERNAL MEDICINE

## 2020-06-21 PROCEDURE — 74018 RADEX ABDOMEN 1 VIEW: CPT | Performed by: HOSPITALIST

## 2020-06-21 RX ORDER — HYDRALAZINE HYDROCHLORIDE 25 MG/1
25 TABLET, FILM COATED ORAL EVERY 8 HOURS SCHEDULED
Status: DISCONTINUED | OUTPATIENT
Start: 2020-06-21 | End: 2020-06-22

## 2020-06-21 RX ORDER — METOPROLOL SUCCINATE 25 MG/1
25 TABLET, EXTENDED RELEASE ORAL ONCE
Status: COMPLETED | OUTPATIENT
Start: 2020-06-21 | End: 2020-06-21

## 2020-06-21 RX ORDER — POLYETHYLENE GLYCOL 3350 17 G/17G
17 POWDER, FOR SOLUTION ORAL DAILY
Status: DISCONTINUED | OUTPATIENT
Start: 2020-06-21 | End: 2020-06-24

## 2020-06-21 RX ORDER — LABETALOL HYDROCHLORIDE 5 MG/ML
10 INJECTION, SOLUTION INTRAVENOUS ONCE
Status: COMPLETED | OUTPATIENT
Start: 2020-06-21 | End: 2020-06-21

## 2020-06-21 RX ORDER — MORPHINE SULFATE 4 MG/ML
4 INJECTION, SOLUTION INTRAMUSCULAR; INTRAVENOUS EVERY 4 HOURS PRN
Status: DISCONTINUED | OUTPATIENT
Start: 2020-06-21 | End: 2020-06-23

## 2020-06-21 RX ORDER — MORPHINE SULFATE 4 MG/ML
4 INJECTION, SOLUTION INTRAMUSCULAR; INTRAVENOUS EVERY 6 HOURS PRN
Status: DISCONTINUED | OUTPATIENT
Start: 2020-06-21 | End: 2020-06-21

## 2020-06-21 RX ORDER — HYDRALAZINE HYDROCHLORIDE 50 MG/1
50 TABLET, FILM COATED ORAL EVERY 8 HOURS SCHEDULED
Status: DISCONTINUED | OUTPATIENT
Start: 2020-06-21 | End: 2020-06-21

## 2020-06-21 RX ORDER — HYDRALAZINE HYDROCHLORIDE 25 MG/1
25 TABLET, FILM COATED ORAL EVERY 8 HOURS SCHEDULED
Status: DISCONTINUED | OUTPATIENT
Start: 2020-06-21 | End: 2020-06-21

## 2020-06-21 RX ORDER — SODIUM CHLORIDE 9 MG/ML
INJECTION, SOLUTION INTRAVENOUS CONTINUOUS
Status: DISCONTINUED | OUTPATIENT
Start: 2020-06-22 | End: 2020-06-23

## 2020-06-21 RX ORDER — POTASSIUM CHLORIDE 20 MEQ/1
40 TABLET, EXTENDED RELEASE ORAL EVERY 4 HOURS
Status: DISPENSED | OUTPATIENT
Start: 2020-06-21 | End: 2020-06-21

## 2020-06-21 NOTE — PROGRESS NOTES
[facility]     [facility] Cardiology Progress Note    Gilford Bosworth Patient Status:  Observation    10/1/1960 MRN [id number]   Location [facility] 3W/SW Attending Keisha Murray MD   Hosp Day # 0 PCP PHYSICIAN NONSTAFF     Subject Transdermal Q24H   • acetaminophen  1,000 mg Oral Once   • Insulin Aspart Pen  1-7 Units Subcutaneous TID CC       Labs:  Recent Labs   Lab 06/21/20  0502   RBC 3.86*   HGB 11.2*   HCT 32.8*   MCV 85.0   MCH 29.0   MCHC 34.1   RDW 14.7   NEPRELIM 6.56   WB MD [facility] Cardiology  6/21/2020  8:36 AM

## 2020-06-21 NOTE — PLAN OF CARE
Problem: Patient/Family Goals  Goal: Patient/Family Long Term Goal  Description  Patient's Long Term Goal: to get healthy    Interventions:  - consults, meds, tests, diet, therapy  - See additional Care Plan goals for specific interventions   Outcome: Pr response  - Consider cultural and social influences on pain and pain management  - Manage/alleviate anxiety  - Utilize distraction and/or relaxation techniques  - Monitor for opioid side effects  - Notify MD/LIP if interventions unsuccessful or patient rep

## 2020-06-21 NOTE — PLAN OF CARE
Pt agitated and demanding today. Pt refused BP medication last night and spit out his 5am medications. This RN found his pills right after shift change on the table.  Later in the shift the patient was found spitting on the floor half an hour after giving P range  Description  INTERVENTIONS:  - Monitor Blood Glucose as ordered  - Assess for signs and symptoms of hyperglycemia and hypoglycemia  - Administer ordered medications to maintain glucose within target range  - Assess barriers to adequate nutritional i strengthening/mobility  - Encourage toileting schedule  Outcome: Progressing     Problem: DISCHARGE PLANNING  Goal: Discharge to home or other facility with appropriate resources  Description  INTERVENTIONS:  - Identify barriers to discharge w/pt and careg

## 2020-06-21 NOTE — CONSULTS
[facility]    Report of Consultation    Date of Admission:  6/17/2020  Date of Consult:  6/21/2020   Reason for Consultation:     CHARLES on CKD     History of Present Illness:     Patient is a 66 yrs old male with pmh of CAD s/p multivessel • Hypertension Paternal Uncle    • Lipids Paternal Uncle    • Cancer Paternal Uncle        Social History  Patient Guardians:  Not on file    Other Topics            Concern    None on file    Social History Narrative    None on file            Current M 50 % injection 50 mL, 50 mL, Intravenous, Q15 Min PRN    Or  glucose (DEX4) oral liquid 30 g, 30 g, Oral, Q15 Min PRN    Or  Glucose-Vitamin C (DEX-4) chewable tab 8 tablet, 8 tablet, Oral, Q15 Min PRN  Insulin Aspart Pen (NOVOLOG) 100 UNIT/ML flexpen 1-7 : 200 lb (90.7 kg)  08/23/18 : 200 lb (90.7 kg)  07/20/18 : 197 lb 6.4 oz (89.5 kg)      General appearance: alert, appears stated age and cooperative  Head: Normocephalic, atraumatic  Eyes: conjunctivae/corneas clear  Throat: lips, mucosa, and tongue norm 0.90 - 1.10 Final     No results for input(s): BNP in the last 168 hours. Impression:       1.  CHARLES on CKD : nonoliguric   - last known creatinine from September 2018 was 1.16 mg/dl and an eGFR >60 ml/min  - Cr on last discharge 1.39 mg/dl on 6/1 afte

## 2020-06-21 NOTE — OCCUPATIONAL THERAPY NOTE
OCCUPATIONAL THERAPY EVALUATION - INPATIENT     Room Number: 425/384-E  Evaluation Date: 6/21/2020  Type of Evaluation: Initial  Presenting Problem: chest pain    Physician Order: IP Consult to Occupational Therapy  Reason for Therapy: ADL/IADL Dysfunction based on documentation in the [facility] '6 clicks' Inpatient Daily Activity Short Form. Research supports that patients with this level of impairment may benefit from ROMINA.      DISCHARGE RECOMMENDATIONS  OT Discharge Recommendations: Sub-acute rehabilitation wife in an apartment. Pt reports being independent w/ ambulation. Pt has assist for le dressing.  Pt does not drive    SUBJECTIVE  \"I have had this done before and it never hurt this bad\"    OCCUPATIONAL THERAPY EXAMINATION      OBJECTIVE  Precautions: Be Standing: rw and min a    FUNCTIONAL ADL ASSESSMENT  Grooming: set up  Feeding: mod i  Bathing: mod a le  Toileting: mod i  Upper Extremity Dressing: set  up  Lower Extremity Dressing: mod a    Education Provided: Pt instructed in log roll   Patient End of

## 2020-06-21 NOTE — PROGRESS NOTES
[facility] HOSP - [facility]    Progress Note    Beatris Burleson Patient Status:  Observation    10/1/1960 MRN [id number]   Location [facility]5W Attending Sandeep Balbuena MD   Hosp Day # 0 PCP PHYSICIAN NONSTAFF       Subjective:   Beatris Burleson is Isosorbide Mononitrate ER  30 mg Oral Daily   • ticagrelor  90 mg Oral Q12H   • aspirin  81 mg Oral Daily   • ipratropium-albuterol  3 mL Nebulization Once   • Levothyroxine Sodium  200 mcg Oral Before breakfast   • Senna-Docusate Sodium  2 tablet Oral [place] Case A1c:  Lab Results   Component Value Date    A1C 7.5 (H) 06/17/2020    A1C 6.2 (H) 05/30/2019    A1C 5.6 07/22/2018       Culture:  No results found for this visit on 06/17/20.     Imaging/EKG:   Xr Abdomen (1 View) (cpt=74018)    Result Date: 6/21/202 rectal impaction. Correlate clinically. 3. Small bilateral inguinal hernias containing fat. Diffuse fatty infiltration of the liver. Prominent right lobe as discussed. Small hiatal hernia. Large left renal parapelvic cysts. No hydronephrosis.  4. Part DDIMER 0.99 (H) 08/23/2018    CRP 0.41 07/23/2018       Assessment and Plan:   Abnormal stress test   H/O Multivessel coronary artery disease with occluded circumflex stent  100% RCA with collaterals, and diseased LAD and circumflex  He refused bypass s

## 2020-06-21 NOTE — PROGRESS NOTES
Pt had one episode of emesis when attempting to eat dinner. Holding 1800 meds until nausea resolves. IV zofran given. Pt demanding to speak with Dr. Emiliano Eduardo tomorrow about his cath. He states, \"I wish to see the doctor who messed up my surgery. \"     184

## 2020-06-21 NOTE — PROGRESS NOTES
Pt refused accucheck this morning. Pt very demanding and agitated. MD notified. Pt requesting only 4mg IV morphine and states he will not take pain pills. Pt stated he couldn't drink the contrast for his CT.

## 2020-06-21 NOTE — PHYSICAL THERAPY NOTE
PHYSICAL THERAPY EVALUATION - INPATIENT     Room Number: 704/463-V  Evaluation Date: 6/21/2020  Type of Evaluation: Initial   Physician Order: See Comment for Specific Order(weakness from hx CVA, wife wants ROMINA)    Presenting Problem: chest pain, s/p card Inpatient Basic Mobility Short Form. Research supports that patients with this level of impairment may benefit from rehab stay. Pt was IND with ADLs prior to admission. Currently MIN A with limited activity tolerance, balance deficits.  Anticipate pt will Yes  Stairs to Bedroom: 0  Lives With: Spouse  Drives: No  Patient Owned Equipment: None  Patient Regularly Uses: None    Prior Level of Rowe: IND with most ADLs without assistive device. Pt has assist for bathing and lower body dressing from wife. side of the bed?: A Little   How much help from another person does the patient currently need. ..   -   Moving to and from a bed to a chair (including a wheelchair)?: A Little   -   Need to walk in hospital room?: A Little   -   Climbing 3-5 steps with a r Goal #6    Goal #6  Current Status

## 2020-06-22 PROCEDURE — 99233 SBSQ HOSP IP/OBS HIGH 50: CPT | Performed by: HOSPITALIST

## 2020-06-22 PROCEDURE — 99233 SBSQ HOSP IP/OBS HIGH 50: CPT | Performed by: INTERNAL MEDICINE

## 2020-06-22 PROCEDURE — 99232 SBSQ HOSP IP/OBS MODERATE 35: CPT | Performed by: SURGERY

## 2020-06-22 RX ORDER — METOPROLOL SUCCINATE 25 MG/1
75 TABLET, EXTENDED RELEASE ORAL
Qty: 180 TABLET | Refills: 0 | Status: SHIPPED | OUTPATIENT
Start: 2020-06-22 | End: 2020-06-23

## 2020-06-22 RX ORDER — HYDRALAZINE HYDROCHLORIDE 50 MG/1
50 TABLET, FILM COATED ORAL EVERY 8 HOURS SCHEDULED
Status: DISCONTINUED | OUTPATIENT
Start: 2020-06-22 | End: 2020-06-24

## 2020-06-22 RX ORDER — HYDRALAZINE HYDROCHLORIDE 25 MG/1
75 TABLET, FILM COATED ORAL EVERY 8 HOURS SCHEDULED
Qty: 180 TABLET | Refills: 0 | Status: SHIPPED | OUTPATIENT
Start: 2020-06-22

## 2020-06-22 NOTE — PAYOR COMM NOTE
--------------  CONTINUED STAY REVIEW    Payor: Dustin Christensen #:  [id number]  Authorization Number: [id number]    Began as Observation 6/17/20  Admit ti INPT date: 6/19/20      Admitting Physician: Eliane Graves scale     Hypertension  -VSS     Hyperlipidemia  -started statin     Chronic kidney disease stage 1 to 2  -cardiology aware  -plan was to hydrate prior to cath and minimize dye load     Chronic abdominal wall hernia  Intractable pain  -more tense but CT wi 60mg IVP x1 yesterday  -neg 2.3L UOP, weight stable/unchanged  -hold diuretics with Cr increase     CHARLES on CKD - cardiorenal  -baseline labile, ?1.5-1.6  -Cr 2.15 today post-lasix bolus and IV dye load     HTN - uncontrolled  -toprol 100 XL daily, imdur 30 oriented  CV: RRR nl S1 S2  Neck: mild JVD  Pulm: CTA b/l  Ext: No CCE  L femoral access site: ecchymosis and TTP     L groin US:     Impression: 3 cm left groin hematoma, no evidence of pseudoaneurysm.  Normal arterial and venous waveforms      Assessment 06/21/20 1223 (!) 146/107 — — 106 — — —   06/21/20 0852 (!) 158/119 98.5 °F (36.9 °C) Oral 105 20 95 % —   06/21/20 0459 (!) 169/127 97.9 °F (36.6 °C) Oral 109 20 98 % —     Component Value Date     WBC 11.8 (H) 06/21/2020     HGB 10.7 (L) 06/21/2020     the origin of the large ventral hernia. No evidence for free intraperitoneal air or pneumatosis. Advise surgical consultation. Ill-defined hyperdensity left inguinal region extending into the anterior perineal region likely left inguinal hematoma.   Plea

## 2020-06-22 NOTE — CARDIAC REHAB
Cardiac Rehab Phase I    Activity:  Distance   Assistance needed   Patient tolerated activity . Education:  Handouts provided and reviewed: [address] Diet: Healthy Cardiac diet reviewed.     Disease Process: Disease process rev

## 2020-06-22 NOTE — PROGRESS NOTES
[facility]    Progress Note    Sanjuanita Garcia Patient Status:  Observation    10/1/1960 MRN [id number]   Location [facility]5W Attending Konstantin Martinez MD   Hosp Day # 3 PCP PHYSICIAN NONSTAFF       Subjective:   Sanjuanita Garcia is • ticagrelor  90 mg Oral Q12H   • aspirin  81 mg Oral Daily   • ipratropium-albuterol  3 mL Nebulization Once   • Levothyroxine Sodium  200 mcg Oral Before breakfast   • Senna-Docusate Sodium  2 tablet Oral Daily   • atorvastatin  40 mg Oral Nightly   • Triglycerides >400.0 mg/dL      Desirable <100 mg/dL   Borderline 100-129 mg/dL   High     >=130mg/dL        TRIG 935 (H) 06/18/2020    A1C 7.5 (H) 06/17/2020       A1c:  Lab Results   Component Value Date    A1C 7.5 (H) 06/17/2020    A1C 6.2 (H) 05/30/201 moderate circumferential thickening of the rectal wall with mild to moderate injection of the perirectal fat. I can not exclude a proctitis. There is fecal rectal impaction. Correlate clinically. 3. Small bilateral inguinal hernias containing fat.   Diff previously described large colon and fat containing ventral hernia. These findings appear to represent colonic obstruction to the level of the mid transverse colon at the origin of the large ventral hernia.   No evidence for free intraperitoneal air or pne pain meds, antiemetics, abdominal binder  -ADAT    Diabetes mellitus type 2  Last A1c value was 7.5% done 6/17/2020.  -sliding scale     Hypertension  -BP elevated this morning  -cardiology assisting with meds     Hyperlipidemia  -statin     CHARLES on chronic

## 2020-06-22 NOTE — PROGRESS NOTES
[facility] Dosing Service  Antibiotic Dosing    Sanjuanita Garcia is a 64year old for whom pharmacy is dosing Cefepime for treatment of  sepsis. .  Other antibiotics (Not dosed by pharmacy): Allergies: is allergic to penicillins.     Vitals: /80 (BP Specimen: Blood,peripheral    Blood Culture FREQ X 2 [[id number]] Collected: 06/21/20 2206   Order Status: Resulted Lab Status:  In process Updated: 06/21/20 2213   Specimen: Blood,peripheral        Assessment/Plan: CrCl=49, Pt ro=388nt - Start Cefepime 1

## 2020-06-22 NOTE — PROGRESS NOTES
[facility]    Progress Note    Kye Arroyo Patient Status:  Observation    10/1/1960 MRN [id number]   Location [facility] 3W/SW Attending Artur Worthington MD   Hosp Day # 0 PCP PHYSICIAN NONSTAFF     Assessment and Plan: 0 840 200    I.V.  750  --  --    I.V. 750 -- --    Total Intake 750 840 200       Output    Urine  2200  150  --    Urine 1700 150 --    Urine Occurrence -- -- 1 x    Incontinent Urine Occurrence -- 1 x --    Output (mL) ([REMOVED] Urethral Catheter Non-l CT.  Follow-up contrast enhanced CT abdomen pelvis advised.     Dictated by (CST): Maurice Smith MD on 6/21/2020 at 11:06 AM     Finalized by (CST): Maurice Smith MD on 6/21/2020 at 11:11 AM          Ct Abdomen Pelvis Oral Contrast Only (cpt =52880)

## 2020-06-22 NOTE — PLAN OF CARE
IV morphine Q4H for abdomen and groin pain, patient does get nauseated by pain meds. Poor appetite. IV NS @ 50. Abdominal binder in place, frequent reminders to patient to keep it on to reduce pain.  Patient very particular about how he takes his medication goal  Description  INTERVENTIONS:  - Encourage pt to monitor pain and request assistance  - Assess pain using appropriate pain scale  - Administer analgesics based on type and severity of pain and evaluate response  - Implement non-pharmacological measures to assist at discharge as needed  - Consider post-discharge preferences of patient/family/discharge partner  - Complete POLST form as appropriate  - Assess patient's ability to be responsible for managing their own health  - Refer to Case Management Depart

## 2020-06-22 NOTE — PROGRESS NOTES
[facility] AND [facility]  Progress Note    Zoey Dee Patient Status:  Inpatient    10/1/1960 MRN [id number]   Location [facility] 3W/SW Attending Janell Abraham MD   Hosp Day # 3 PCP PHYSICIAN NONSTAFF     Assessment:    Assessment:  1.  CAD, sta 6/22/2020 0953  Last data filed at 6/22/2020 0700  Gross per 24 hour   Intake 2840 ml   Output —   Net 2840 ml       Wt Readings from Last 2 Encounters:  06/21/20 : 222 lb 6.4 oz (100.9 kg)  06/01/19 : 224 lb 6.4 oz (101.8 kg)      Physical Exam:    Renee Hsu sodium chloride 100 mL/hr at 06/22/20 0000       Jason Barraza MD  6/22/2020  9:53 AM

## 2020-06-22 NOTE — PAYOR COMM NOTE
--------------  CONTINUED STAY REVIEW    Payor: Dustin Christensen #:  [id number]  Authorization Number: [id number]    Began as OBS on 6/17, Inpatient on 6/19  Admit date: 6/19/20  Admit time: 2100    Admitting Physician: XL) 24 hr tab 75 mg, 75 mg, Oral, 2x Daily(Beta Blocker)  PEG 3350 (MIRALAX) powder packet 17 g, 17 g, Oral, Daily  morphINE sulfate (PF) 4 MG/ML injection 4 mg, 4 mg, Intravenous, Q4H PRN  hydrALAzine HCl (APRESOLINE) tab 25 mg, 25 mg, Oral, Q8H [address]  Cefe (NOVOLOG) 100 UNIT/ML flexpen 1-7 Units, 1-7 Units, Subcutaneous, TID CC  nitroGLYCERIN (NITROSTAT) SL tab 0.4 mg, 0.4 mg, Sublingual, Q5 Min PRN           Results:              Recent Labs   Lab 06/21/20  0502 06/21/20  0934 06/21/20  1345 06/21/20  3912 KETUR Negative 06/21/2020     BLOODURINE Small 06/21/2020     PHURINE 5.0 06/21/2020     PROUR 30  06/21/2020     UROBILINOGEN <2.0 06/21/2020     NITRITE Positive 06/21/2020     LEUUR Negative 06/21/2020      No results for input(s): PHOS, ALB in the last performed arterial duplex left lower extremity 06/20/2020.  5.2 cm left lower pole renal cyst which currently has attenuation slightly above water density suggesting a mildly complex cyst.  Advise correlation with renal ultrasound.   Lesser incidental findi 100 mL MBP/add-vantage     Date Action Dose Route User    6/22/2020 1019 New Bag 1 g Intravenous Pauline Moreland, RN    6/21/2020 2319 New Bag 1 g [facility]ven[facility]      Heparin Sodium (Porcine) 5000 UNIT/ML injection 5,000 Units     Da tablet     Date Action Dose Route User    6/22/2020 1023 Given 2 tablet Oral Aleixs Jackson RN      0.9% NaCl infusion     Date Action Dose Route User    6/22/2020 0900 Rate/Dose Change (none) Intravenous Alexis Jackson RN    6/22/2020 0000 New Bag (no

## 2020-06-22 NOTE — PROGRESS NOTES
[facility]    Progress Note      Subjective:     Denies any chest pain or sob.  On clears     CT abdomen noted and currently wearing a binder      Review of Systems:     Constitutional: no fever  Eyes: negative for irritation, redness and Intravenous, Q12H  0.9% NaCl infusion, , Intravenous, Continuous  Metoclopramide HCl (REGLAN) injection 5 mg, 5 mg, Intravenous, Q8H PRN  HYDROcodone-acetaminophen (NORCO) 5-325 MG per tab 1 tablet, 1 tablet, Oral, Q6H PRN  Isosorbide Mononitrate ER (IMDUR HCT 32.8* 33.8* 33.3* 31.2* 29.8*   MCV 85.0 85.8 86.5 85.0 85.9   NEPRELIM 6.56 7.53  --   --  8.62*   WBC 8.3 9.1 12.8* 11.8* 10.7   .0 199.0 214.0 202.0 205.0     Recent Labs   Lab 06/21/20  0502 06/21/20  1835 06/21/20  1943 06/22/20  0454   G hemiabdomen likely ascending colon as well as proximal to mid transverse colon. Colon in the left hemiabdomen is air-filled but not significantly dilated.   When correlated with the recent CT, these findings are concerning for colonic obstruction at the le Ekg 12-lead    Result Date: 6/21/2020  ECG Report  Interpretation  --------------------------       Assessment and Plan:       1.  CHARLES on CKD : nonoliguric   - last known creatinine from September 2018 was 1.16 mg/dl and an eGFR >60 ml/min  - Cr on

## 2020-06-22 NOTE — PROGRESS NOTES
[facility]    Progress Note    Estefany Pablo Patient Status:  Inpatient    10/1/1960 MRN [id number]   Location [facility] 3W/SW Attending Nick Villegas MD   Hosp Day # 3 PCP PHYSICIAN NONSTAFF     Assessment and Plan:        Rambo 150 -- --    Urine Occurrence -- 2 x --    Incontinent Urine Occurrence 1 x -- --    Emesis/NG output  --  --  --    Emesis Occurrence 1 x 1 x --    Total Output 150 -- --       Net I/O     342 4239 156          Exam:   BP (!) 153/107 (BP Location: Right a Portable  (cpt=71045)    Result Date: 6/22/2020  CONCLUSION:  1. No acute cardiopulmonary disease.  2. No significant change    Dictated by (CST): Trevon Colon MD on 6/22/2020 at 7:59 AM     Finalized by (CST): Trevon Colon MD on 6/22/2020 at

## 2020-06-22 NOTE — PAYOR COMM NOTE
--------------  ADMISSION REVIEW     Payor: Dustin Christensen #:  [id number]  Authorization Number: [id number]    Began as OBS on 6/17  Admit to INPT date: 6/19/20       Admitting Physician: Lyudmila Cabrales MD  Attendin PERCUTANEOUS  TRANSLUMINAL CORONARY ANGIOPLASTY     • HERNIA SURGERY  2012   • KIDNEY SURGERY      for stone   • REPAIR ING HERNIA,5+Y/O,REDUCIBL     • STENT PL SINGLE VES      2 cardiac stents x 2 2006 2x in 2014 3x in 2017             Physical Exam     E Narrative: The following orders were created for panel order CBC WITH DIFFERENTIAL WITH PLATELET.   Procedure                               Abnormality         Status                     ---------                               -----------         ------ lesion. STOMACH: Small hiatal hernia. PANCREAS: No lesion, fluid collection, ductal dilatation, or atrophy. ADRENALS: No defined mass or abnormal enlargement. KIDNEYS: Large left renal parapelvic cyst measuring 5.2 x 5.4 cm.   No     kidne extending into the     hernia sac but no definite evidence of     incarceration. Correlate clinically.       2. Large amount of feces in the rectosigmoid with moderate circumferential     thickening of the rectal wall with mild to moderate injection of the mass, acute infarction, or significant     atrophy. CALVARIUM: No apparent fracture, mass, or other significant visible     lesion. SINUSES: Limited views demonstrate no significant mucosal thickening or     fluid.       ORBITS: Limited views are Administered:   Medications   acetaminophen (TYLENOL EXTRA STRENGTH) tab 1,000 mg (1,000 mg Oral Not Given 6/17/20 1556)   Labetalol HCl (TRANDATE) injection 10 mg (10 mg Intravenous Given 6/17/20 1554)   HYDROcodone-acetaminophen (NORCO) 5-325 MG per tab Further Inpatient evaluation and treatment will be required.  I personally discussed the results of the above ED workup and a number of associated acute management issues with the patient, and I explained the need for further follow-up evaluation and tr disease with occluded circumflex stent, 100% RCA with collaterals, and diseased LAD and circumflex. He refused bypass surgery and he had staged intervention to the LAD and circumflex with drug-eluting stents.   Ejection fraction was normal at that time wit edema, clubbing, or cyanosis. NEUROLOGIC:  Motor and sensory intact. Cranial nerves II through XII are intact. ASSESSMENT:    1. Chest pain in a patient with high-risk for coronary artery disease. 2.   Coronary artery disease as outlined above. Mononitrate ER (IMDUR) 24 hr tab 30 mg     Date Action Dose Route User    6/22/2020 1023 Given 30 mg Oral Dharmesh Cabrera RN      Labetalol HCl (TRANDATE) injection 10 mg     Date Action Dose Route User    6/21/2020 1453 Given 10 mg Intravenous Tania Brower tab 75 mg     Date Action Dose Route User    6/22/2020 1023 Given 75 mg Oral Adis Landis RN    6/21/2020 1837 Given 75 mg Oral Elnor Ale        6/22  Subjective:      Denies any chest pain or sob.  On clears      CT abdomen noted and currently wea (APRESOLINE) tab 25 mg, 25 mg, Oral, Q8H ALEXA  Cefepime HCl (MAXIPIME) 1 g in sodium chloride 0.9% 100 mL MBP/add-vantage, 1 g, Intravenous, Q12H  0.9% NaCl infusion, , Intravenous, Continuous  Metoclopramide HCl (REGLAN) injection 5 mg, 5 mg, Intravenous, 06/21/20  0502 06/21/20  0934 06/21/20  1345 06/21/20  1835 06/22/20  0454   RBC 3.86* 3.94* 3.85* 3.67* 3.47*   HGB 11.2* 11.5* 11.2* 10.7* 10.0*   HCT 32.8* 33.8* 33.3* 31.2* 29.8*   MCV 85.0 85.8 86.5 85.0 85.9   NEPRELIM 6.56 7.53  --   --  8.62*   WBC 06/21/2020      No results for input(s): PHOS, ALB in the last 168 hours.     Invalid input(s):  BMP     Xr Abdomen (1 View) (cpt=74018)     Result Date: 6/21/2020  CONCLUSION:   Dilated colon in the right hemiabdomen likely ascending colon as well as proxi correlation with renal ultrasound. Lesser incidental findings as above.       Dictated by (CST): Jaye Hui MD on 6/21/2020 at 12:49 PM     Finalized by (CST): Jaye Hui MD on 6/21/2020 at 1:05 PM           Ekg 12-lead     Result Date: 6/21/ BP Location: Right arm Right arm Right arm Right arm   Pulse: 109 105 106 105   Resp: 20 20       Temp: 97.9 °F (36.6 °C) 98.5 °F (36.9 °C)       TempSrc: Oral Oral       SpO2: 98% 95%       Weight:           Height:                 Patient Weight for th [DISCONTINUED] morphINE sulfate **OR** morphINE sulfate, Metoclopramide HCl, HYDROcodone-acetaminophen, Normal Saline Flush, magnesium hydroxide, bisacodyl, ondansetron HCl, glucose **OR** Glucose-Vitamin C **OR** dextrose **OR** glucose **OR** Glucose-Vit findings are concerning for colonic obstruction at the level of the mid transverse colon which may be related to the large ventral hernia seen on recent CT. Follow-up contrast enhanced CT abdomen pelvis advised.     Dictated by (CST): Ivana Faulkner MD Ap Portable  (cpt=71045)     Result Date: 6/17/2020  CONCLUSION:  1. Little change from May 30, 2019. 2. Tortuous aorta. 3. April pleural thickening. Dictated by (CST): Melissa Cassidy MD on 6/17/2020 at 2:46 PM     Finalized by (CST):  Aj Burris diuresis.  110 cc dye used.   -cont asa, brilinta   -cont BB  -statin  -monitor h+h for hematoma L groin     Chronic abdominal wall hernia  Intractable pain  -seen by Surgeon, rec for abdominal binder and outpatient hernia repair once cleared by cardiology MD  6/20/2020 6/19  Cardiologist: Hai Pillai MD  Primary Proceduralist: Hai Pillai MD  Procedure Performed: LHC, LV and PCI Cx, OM, diagonal  Date of Procedure: 6/19/2020   Indication: positive stress test     Summary of procedure:  Successful angiopl appeared that there was a proximal/ostial stenosis prior to the takeoff of the stents which caused the stents to restenosed. The distal runoff is poor and therefore decided not to stent distally.   When ballooning the ostial OM there was plaque shift into burden  HFpEF - lasix 60 mg IV x 1, reassess UOP and Cr after dye load today, potentially re dose lasix tomorrow  HTN -         Description of Procedure:   After written informed consent was obtained from the patient, patient was brought to the cardiac cat 06/19/20 1810 — 88 17 176/120Abnormal  96 % — — — LR   06/19/20 1805 — 80 19 147/98Abnormal  94 % — — — LR   06/19/20 1800 — 80 6Abnormal  137/107Abnormal  98 % — None (Room air) — LR   06/19/20 1730 — 74 10 162/119Abnormal  100 % — — — LR

## 2020-06-22 NOTE — PLAN OF CARE
Problem: Patient Centered Care  Goal: Patient preferences are identified and integrated in the patient's plan of care  Description  Interventions:   - What would you like us to know as we care for you?  I have had this hernia for 12 years  - Provide timel severity of pain and evaluate response  - Implement non-pharmacological measures as appropriate and evaluate response  - Consider cultural and social influences on pain and pain management  - Manage/alleviate anxiety  - Utilize distraction and/or relaxatio be responsible for managing their own health  - Refer to Case Management Department for coordinating discharge planning if the patient needs post-hospital services based on physician/LIP order or complex needs related to functional status, cognitive abilit

## 2020-06-23 PROCEDURE — 99233 SBSQ HOSP IP/OBS HIGH 50: CPT | Performed by: HOSPITALIST

## 2020-06-23 PROCEDURE — 99232 SBSQ HOSP IP/OBS MODERATE 35: CPT | Performed by: INTERNAL MEDICINE

## 2020-06-23 RX ORDER — CARVEDILOL 12.5 MG/1
12.5 TABLET ORAL 2 TIMES DAILY WITH MEALS
Status: DISCONTINUED | OUTPATIENT
Start: 2020-06-23 | End: 2020-06-24

## 2020-06-23 RX ORDER — NICOTINE 21 MG/24HR
1 PATCH, TRANSDERMAL 24 HOURS TRANSDERMAL EVERY 24 HOURS
Refills: 0 | Status: SHIPPED | COMMUNITY
Start: 2020-06-23

## 2020-06-23 RX ORDER — MORPHINE SULFATE 15 MG/1
15 TABLET ORAL EVERY 4 HOURS PRN
Status: DISCONTINUED | OUTPATIENT
Start: 2020-06-23 | End: 2020-06-24

## 2020-06-23 RX ORDER — MORPHINE SULFATE 4 MG/ML
4 INJECTION, SOLUTION INTRAMUSCULAR; INTRAVENOUS EVERY 6 HOURS PRN
Status: DISCONTINUED | OUTPATIENT
Start: 2020-06-23 | End: 2020-06-24

## 2020-06-23 RX ORDER — ISOSORBIDE MONONITRATE 60 MG/1
60 TABLET, EXTENDED RELEASE ORAL DAILY
Status: DISCONTINUED | OUTPATIENT
Start: 2020-06-24 | End: 2020-06-24

## 2020-06-23 RX ORDER — CARVEDILOL 12.5 MG/1
12.5 TABLET ORAL 2 TIMES DAILY WITH MEALS
Qty: 60 TABLET | Refills: 1 | Status: SHIPPED | OUTPATIENT
Start: 2020-06-23

## 2020-06-23 RX ORDER — MORPHINE SULFATE 15 MG/1
15 TABLET ORAL EVERY 4 HOURS PRN
Qty: 30 TABLET | Refills: 0 | Status: SHIPPED | OUTPATIENT
Start: 2020-06-23

## 2020-06-23 RX ORDER — ISOSORBIDE MONONITRATE 60 MG/1
60 TABLET, EXTENDED RELEASE ORAL DAILY
Qty: 30 TABLET | Refills: 1 | Status: SHIPPED | OUTPATIENT
Start: 2020-06-24

## 2020-06-23 NOTE — CM/SW NOTE
Case Management/ Progression of Care    Met with patient at the bedside for discharge planning, patient stated he lives at home with his son and prior to admission he was independent in ADL.    Patient stated at home he is able to ambulate independently wi

## 2020-06-23 NOTE — PROGRESS NOTES
[facility]    Progress Note    Estefany Pablo Patient Status:  Inpatient    10/1/1960 MRN [id number]   Location [facility] 3W/SW Attending Nick Villegas MD   Hosp Day # 4 PCP PHYSICIAN NONSTAFF        Subjective:     Constitu cath.     Moderate anemia  -hemoglobin stable.        Okay for transfer to rehab from cardiology standpoint.               Results:     Lab Results   Component Value Date    WBC 7.9 06/23/2020    HGB 10.1 (L) 06/23/2020    HCT 30.5 (L) 06/23/2020    PLT 21 proximal to mid transverse colon. Focal narrowing of the mid transverse colon at the mouth of previously described large colon and fat containing ventral hernia.   These findings appear to represent colonic obstruction to the level of the mid transverse co

## 2020-06-23 NOTE — PLAN OF CARE
Patient currently resting in bed alarm on with call light in reach. AOx4 on RA. DM2 ACHS. Pt currently wearing abdominal binder for hernia. C/O moderate to severe chest/abd pain currently managed with PO Morphine.  Cardiology saw pt today and cleared for [facility] diabetes  Outcome: Progressing     Problem: PAIN - ADULT  Goal: Verbalizes/displays adequate comfort level or patient's stated pain goal  Description  INTERVENTIONS:  - Encourage pt to monitor pain and request assistance  - Assess pain using appropriate pa resources and transportation as appropriate  - Identify discharge learning needs (meds, wound care, etc)  - Arrange for interpreters to assist at discharge as needed  - Consider post-discharge preferences of patient/family/discharge partner  - Complete MATT

## 2020-06-23 NOTE — PLAN OF CARE
Patient alert, awake, lying in bed watching TV throughout the night. PRN pain medication given. Abdominal binder in place. Urinal at the bedside. Pt up with assist. Reinforced to call for help when need to ambulate. Call light within reach.  Frequent monito

## 2020-06-23 NOTE — CM/SW NOTE
Case Management/ Progression of Care    Referrals were sent via Mike, Patient was provided with choices and has chosen [facility]. In [address] [employer] authorization is needed for patient  BC/BS medicaid plane for SNF via [facility].    Allie

## 2020-06-23 NOTE — PLAN OF CARE
- pt c/o cp  - appears to have \"glazed'  Distant appearance  - reports sternal non-reproducible pain  - EKG done appears better than last EKG on 6/21.    TWI lateral improved, not as deep , and overall the same w/o changes  - HD stable oxygenating with n

## 2020-06-23 NOTE — PROGRESS NOTES
[facility]    Progress Note      Subjective:     Feels better today -ordered eggs for BG     Tolerating po     No BM    Review of Systems:     Constitutional: no fever  Eyes: negative for irritation, redness and visual disturbance  Respir Daily  Cefepime HCl (MAXIPIME) 1 g in sodium chloride 0.9% 100 mL MBP/add-vantage, 1 g, Intravenous, Q12H  0.9% NaCl infusion, , Intravenous, Continuous  Metoclopramide HCl (REGLAN) injection 5 mg, 5 mg, Intravenous, Q8H PRN  HYDROcodone-acetaminophen (NOR 10.1*   HCT 33.8*   < > 31.2* 29.8* 30.5*   MCV 85.8   < > 85.0 85.9 88.4   NEPRELIM 7.53  --   --  8.62* 5.88   WBC 9.1   < > 11.8* 10.7 7.9   .0   < > 202.0 205.0 214.0    < > = values in this interval not displayed.      Recent Labs   Lab 06/21/20 ventral hernia seen on recent CT. Follow-up contrast enhanced CT abdomen pelvis advised.     Dictated by (CST): Dunia Castro MD on 6/21/2020 at 11:06 AM     Finalized by (CST): Dunia Castro MD on 6/21/2020 at 11:11 AM          Xr Chest Ap Portabl ABNORMAL When compared with ECG of 06/17/2020 13:38:17 No significant changes have occurred Electronically signed on 06/22/2020 at 17:24 by Carrie Price M.D. Assessment and Plan:       1.  CHARLES on CKD : nonoliguric   - last known creatinine from Septe

## 2020-06-23 NOTE — PAYOR COMM NOTE
--------------  CONTINUED STAY REVIEW    Payor: Dustin Christensen #:  [id number]  Authorization Number: [id number]          6/22 CM NOTE     Case Management/ Progression of Care     Met with patient at the bedside for [employer]    -Consider surgical repair after 3 months of DAPT,he could be bridged with a Cangrelor drip while the antiplatelet effect of ticagrelor wears off.     Hypertension  - SBP stable. Cont hydralazine 50mg q8hrs.   Manage pain with Norco if pt will agree, pre 3.9    107 110   CO2 25.0 25.0 25.0   ALKPHO  --   --  79   AST  --   --  36   ALT  --   --  59   BILT  --   --  0.6   TP  --   --  7.3     Abnormal stress test   H/O Multivessel coronary artery disease with occluded circumflex stent  100% RCA with c - last known creatinine from September 2018 was 1.16 mg/dl and an eGFR >60 ml/min  - Cr on last discharge 1.39 mg/dl on 6/1 after CHARLES from [facility] and NSAIDs injury  - BUN/Cr 25/1.53 mg/dl on admit -> Cr 2.15 mg/dl post IV contrast and diuretics  - Cr improv 6/22/2020 2043 Given 5000 Units Subcutaneous (Right Lower Abdomen) Mable Quesada RN      hydrALAzine HCl (APRESOLINE) tab 50 mg     Date Action Dose Route User    6/23/2020 0512 Given 50 mg Oral [place]    6/22/2020 2044 Given 50 mg Oral Route User    6/23/2020 0512 Given 90 mg Oral Dionisio Kemp [facility]    6/22/2020 1732 Given 90 mg Oral Ashlyn Loza RN      Metoprolol Succinate ER (Toprol XL) 24 hr tab 75 mg     Date Action Dose Route User    6/23/2020 0513 Given 75 mg Oral Jason Goldman

## 2020-06-23 NOTE — PROGRESS NOTES
[facility] - [facility]    Progress Note    Aminata Patience Patient Status:  Observation    10/1/1960 MRN [id number]   Location [facility]W Attending Charmayne Cranker, MD   Hosp Day # 4 PCP PHYSICIAN NONSTAFF       Subjective:   Aminata Patience is Non-distended, Binder in place  MUSCULOSKELETAL:  There was no obvious deformity. EXTREMITIES: There was no edema.     Current Scheduled Inpatient Meds:     • [START ON 6/24/2020] Isosorbide Mononitrate ER  60 mg Oral Daily   • carvedilol  12.5 mg Oral BI 107 110   CO2 25.0 25.0 25.0   ALKPHO  --   --  79   AST  --   --  36   ALT  --   --  59   BILT  --   --  0.6   TP  --   --  7.3     Lab Results   Component Value Date    PTT 30.8 05/31/2019    INR 1.08 05/28/2019    T4F 1.3 07/23/2018    TSH 8.370 (H) 07/ Cerebral and cerebellar atrophy. 5. Atherosclerotic calcification cavernous carotid arteries.     Dictated by (CST): Mauricio Jaquez MD on 6/17/2020 at 4:39 PM     Finalized by (CST): Mauricio Jaquez MD on 6/17/2020 at 4:43 PM          Ct Abdomen+pelvis(cpt=7 7:59 AM     Finalized by (CST): Dada Darnell MD on 6/22/2020 at 8:01 AM          Xr Chest Ap Portable  (cpt=71045)    Result Date: 6/17/2020  CONCLUSION:  1. Little change from May 30, 2019. 2. Tortuous aorta. 3. April pleural thickening.      Dictat 17:24 by Manuel Davison M.D.       Lab Results   Component Value Date    DDIMER 0.99 (H) 08/23/2018    CRP 0.41 07/23/2018    PCT 0.20 (H) 06/21/2020       Assessment and Plan:   Abnormal stress test   H/O Multivessel coronary artery disease with occluded c Greater than 35 minutes spent, >50% spent counseling re: treatment plan and workup    Sergio Lindsay MD  [facility]

## 2020-06-24 ENCOUNTER — APPOINTMENT (OUTPATIENT)
Dept: CT IMAGING | Facility: HOSPITAL | Age: 60
DRG: 229 | End: 2020-06-24
Attending: HOSPITALIST
Payer: MEDICAID

## 2020-06-24 VITALS
OXYGEN SATURATION: 97 % | DIASTOLIC BLOOD PRESSURE: 61 MMHG | BODY MASS INDEX: 32.91 KG/M2 | HEIGHT: 69 IN | SYSTOLIC BLOOD PRESSURE: 123 MMHG | RESPIRATION RATE: 17 BRPM | HEART RATE: 88 BPM | TEMPERATURE: 98 F | WEIGHT: 222.19 LBS

## 2020-06-24 PROCEDURE — 74176 CT ABD & PELVIS W/O CONTRAST: CPT | Performed by: HOSPITALIST

## 2020-06-24 PROCEDURE — 99239 HOSP IP/OBS DSCHRG MGMT >30: CPT | Performed by: HOSPITALIST

## 2020-06-24 NOTE — PLAN OF CARE
Patient is alert and orientated but can be very irritable and aggressive towards staff. Vital signs stable. Denies n/v. Poor po intake. Acchecks WNL. No coverage needed. PO morphine for pain control. Ambulating with stand by and walker. Saline locked.  Voi patient/family to participate in care and decision-making at the level they choose  - Honor patient and family perspectives and choices   Outcome: Adequate for Discharge     Problem: Diabetes/Glucose Control  Goal: Glucose maintained within prescribed rang Educate pt/family on patient safety including physical limitations  - Instruct pt to call for assistance with activity based on assessment  - Modify environment to reduce risk of injury  - Provide assistive devices as appropriate  - Consider OT/PT consult

## 2020-06-24 NOTE — PROGRESS NOTES
Attempted to call Salina Boothe of [employer] 3 times. No answer at the nurses station. Will attempt to call later.

## 2020-06-24 NOTE — PLAN OF CARE
Problem: Patient/Family Goals  Goal: Patient/Family Long Term Goal  Description  Patient's Long Term Goal: to get healthy    Interventions:  - consults, meds, tests, diet, therapy  - See additional Care Plan goals for specific interventions   Outcome: Pr response  - Consider cultural and social influences on pain and pain management  - Manage/alleviate anxiety  - Utilize distraction and/or relaxation techniques  - Monitor for opioid side effects  - Notify MD/LIP if interventions unsuccessful or patient rep discharge planning if the patient needs post-hospital services based on physician/LIP order or complex needs related to functional status, cognitive ability or social support system  Outcome: Progressing    Patient alert and oriented.  Fall precautions in p

## 2020-06-24 NOTE — CM/SW NOTE
06/24/20 1100   Discharge disposition   Expected discharge disposition Skilled Nurs   Name of Facillity/Home Care/Hospice   (Samantha Fox, )   Discharge transportation [facility]   MDO received for discharge to [address] [place] [address].  List

## 2020-06-25 NOTE — DISCHARGE SUMMARY
[facility]    PATIENT'S NAME: Daniela Michelle   ATTENDING PHYSICIAN: Sapna Wilhelm MD   PATIENT ACCOUNT#:   [id number]    LOCATION:  [address] [place] [address] #:   [id number]       YOB: 1960  ADMISSION DATE:       06/17/2020 found to have a small left groin hematoma. Hemoglobin and hematocrit were monitored. The patient is a very agitated person. He was seen and examined this morning.   He states he is still quite a lot of pain and requesting narcotic pain medications as shivam

## (undated) NOTE — IP AVS SNAPSHOT
[address]            (For Outpatient Use Only) Initial Admit Date: 7/20/2018   Inpt/Obs Admit Date: Inpt: 7/24/18 / Obs: 07/20/18   Discharge Date:    Hospital Acct:  [de-identified]   MRN: [de-identified]   CSN: [id number]        [facility]

## (undated) NOTE — LETTER
Date & Time: 9/12/2018, 3:31 PM  Patient: Antonio Watt  Encounter Provider(s):     Marta Garza MD          I have seen Antonio Shukri 10/1/1960 and found him medically cleared for incarceration with [facility].        ____________________

## (undated) NOTE — IP AVS SNAPSHOT
[facility]            (For Outpatient Use Only) Initial Admit Date: 6/17/2020   Inpt/Obs Admit Date: Inpt: 6/19/20 / Obs: 06/17/20   Discharge Date:    Sandra Dia:  [de-identified]   MRN: [de-identified]   CSN: [id number]   CEID: [id number] Hospital Account Financial Class: Medicaid Advantage    June 24, 2020

## (undated) NOTE — LETTER
[facility], [place]  Authorization for Invasive Procedures  1.  I hereby authorize Dr. Guillermo Medina , my physician and whomever may be designated as the doctor's assistant, to perform the following operation and/or procedure: Cardiac c the event that I wish to have autologous transfusions of my own blood, or a directed donor transfusion, I will discuss this with my physician.      5. I consent to the photographing of the operations or procedures to be performed for the purposes of advanci Responsible person in case of minor or unconscious: _____________________________Relationship: ____________     Witness Signature: ____________________________________________ Date: __________ Time: ___________    Statement of Physician  My signature below

## (undated) NOTE — ED AVS SNAPSHOT
Simon Hesham   MRN: [id number]    Department:  [facility] Emergency Department   Date of Visit:  8/23/2018           Disclosure     Insurance plans vary and the physician(s) referred by the ER may not be covered by your plan.  Please contact you CARE PHYSICIAN AT ONCE OR RETURN IMMEDIATELY TO THE EMERGENCY DEPARTMENT. If you have been prescribed any medication(s), please fill your prescription right away and begin taking the medication(s) as directed.   If you believe that any of the medications

## (undated) NOTE — ED AVS SNAPSHOT
Kaye Hennessy   MRN: [id number]    Department:  [facility] Emergency Department   Date of Visit:  9/12/2018           Disclosure     Insurance plans vary and the physician(s) referred by the ER may not be covered by your plan.  Please contact you CARE PHYSICIAN AT ONCE OR RETURN IMMEDIATELY TO THE EMERGENCY DEPARTMENT. If you have been prescribed any medication(s), please fill your prescription right away and begin taking the medication(s) as directed.   If you believe that any of the medications

## (undated) NOTE — IP AVS SNAPSHOT
Patient Demographics     Address  [address] [place] [address] Phone  [phone] [facility])      Emergency Contact(s)     Name Relation Home Work Mobile    Tony Baeza Son   [phone]      Allergies as of 7/25/2018  Reviewed on: 7/22/2018       No Next dose due:  Received today at 1555 pm.      Take 1 tablet (25 mg total) by mouth 3 (three) times daily as needed for Dizziness.    Shane Sheffield MD         ondansetron 4 MG Tbdp  Commonly known as:  ZOFRAN-ODT      Take 1 tablet (4 mg total) by mouth e [id number] (MIRALAX) powder packet 17 g 07/25/18 0909 Given      [id number] Senna (SENOKOT) tab TABS 8.6 mg 07/24/18 2011 Given      [id number] Senna (SENOKOT) tab TABS 8.6 mg 07/25/18 0909 Given      [id number] TraMADol HCl (ULTRAM) tab 50 mg 07/25/ ALMA DELIA is highly specific for SLE with a sensitivity of 50-60   percent. Some patients with early or inactive SLE may be positive for   anti-dsDNA IgG by TAY but negative by ALMA DELIA.  If the ALMA DELIA result   is negative but the patient has a positive TAY Components    Component Value Reference Range Flag Lab   Mitochondrial M2 Ab, IgG 13.6 0.0 - 20.0 Units — [facility]   Comment:         INTERPRETIVE INFORMATION: Mitochondrial (M2) Antibody, IgG        20.0 Units or less . ........  Negative    20.1 - 24.9 Un Testing Performed By     Debo Carbajal Name Director Address Valid Date Range    [address] [facility], [place] [address] [facility] [id number] 11/14/14 4078 - Present    [address] [facility] Makayla Abdi Comment: 2 cardiac stents x 2 2006 2x in 2014 3x in                2017[NG.2]    Social History:[NG.1]  reports that he has been smoking. He has never used smokeless tobacco.[NG.2]    Family History:[NG.1] No family history on file. [NG.2]    Allergies PTP  13.6   INR  1.00       Recent Labs   Lab  07/20/18 2033   TROP  <0.046[NG.2]       Imaging: Imaging data reviewed in Epic. ASSESSMENT / PLAN:     1. Atypical chest pain  1. EKG with non-specific changes  2. Troponin negative  3. Tele  4.  Recent History of Present Illness:[FL.1] Oral Baeza[FL.2] is a a(n)[FL.3 56year old[FL.2] male. Mr. Alan Jade has a long history of coronary artery disease. He has had stents placed in his heart in 2008, 2014, in 2017. He also has a history of 2 strokes.   [place] while in the hospital.    History:  PMH. Coronary artery disease. Hypertension. Hypothyroidism. 2 past strokes. Mild renal insufficiency.   Kidney stone in the past.  [address]  2000-08-02 stents placed in the heart, 2014 2 more stents, 2017 3 stents placed [COMPLETED] morphINE sulfate (PF) 4 MG/ML injection 4 mg 4 mg Intravenous Once   [COMPLETED] ondansetron HCl (ZOFRAN) injection 4 mg 4 mg Intravenous Once[FL. 2]     Review of Systems: See HPI.   Patient states he has a umbilical hernia bulge in the center o  07/23/2018   CO2 22.0 07/23/2018   GLU 82 07/23/2018   CA 9.3 07/23/2018   CRP 0.41 07/23/2018[FL.2]        Imaging: July 12, 2018 CTA from [place] of the head and neck demonstrate complete cessation of flow at the proximal segment of the left knee f However recently he was found to have a positive JOESPH test and positive DNA test.  My initial impression is that these are most likely false positive tests.   If his atherosclerosis is not responsible for his strokes, possibly he has some type of hypercoagu PHYSICAL THERAPY EVALUATION - INPATIENT     Room Number: 4260/2034-I  Evaluation Date: 7/23/2018  Type of Evaluation: Initial  Physician Order: PT Eval and Treat (recent stroke and fall, L sided weakness)    Presenting Problem: Chest pain  Reason for The • Visual impairment     L eye - lazy eye       Past Surgical History  Past Surgical History:  No date: CATH DRUG ELUTING STENT  No date: CATH PERCUTANEOUS  TRANSLUMINAL CORONARY ANGIO*  2012: HERNIA SURGERY  No date: KIDNEY SURGERY      Comment: for stone · Memory:  appears intact  · Following Commands:  follows multistep commands with increased time and follows multistep commands with repetition  · Initiation: cues to initiate tasks    RANGE OF MOTION AND STRENGTH ASSESSMENT  Lower extremity ROM is within Standardized Score (AM-PAC Scale): 45.44   CMS Modifier (G-Code): CK    FUNCTIONAL ABILITY STATUS  Gait Assessment   Gait Assistance: Maximum assistance (Max A per FIM but required Min A)  Distance (ft): 50  Assistive Device: Rolling walker  Pattern:  (dec as functional limitations in independent bed mobility, transfers, and gait. Functional outcome measures completed include gait speed and AM-PAC. The patient ambulates with self-selected gait speed = .16 meters/second.   This correlates with discharge to s Filed:  7/23/2018  1:38 PM Date of Service:  7/23/2018  9:23 AM Status:  Signed    :  Gerald Jean (OT Student)    Related Notes:  Original Note by Gerald Jean (OT Student) filed at 7/23/2018 12:19 PM    Cosigner:  Rinku Magana OT a • Coronary atherosclerosis    • Disorder of liver     \"enlarged\"   • Disorder of thyroid    • Essential hypertension    • Hearing impairment    • Heart attack ([address]    • Hernia, abdominal    • High blood pressure    • High cholesterol    • Muscle weaknes him as he stands/ holds on to a grab bar); wife sponge bathes him when he is feeling more lightheaded, able to transfer Mod Ind w/ RW to the toilet with supervision, toileting performed with supervision, grooming performed seated with set- up.   For safety, Right Shoulder flexion  4/5  Left Shoulder flexion  4/5  Left   4-/5[RM. 3]    COORDINATION  Gross Motor[RM.1]    WFL[RM. 3]   Fine Motor    WFL      NEUROLOGICAL FINDINGS[RM.1]  Neurological Findings: Coordination - Finger to Nose;Coordination - Rapid A degrees. Patient performed static sit at EOB for 2 minutes with instructions to pause between position changes and to focus on something in room. Patient performed sit <> stand with RW at [facility] w/ 2 minute pause prior to functional ambulation w/ RW.  Patient history including review of medical or therapy record[RM.3]   Specific performance deficits impacting engagement in ADL/IADL[RM.1] MODERATE  3 - 5 performance deficits[RM. 3]   Client Assessment/Performance Deficits[RM. 1] MODERATE - Comorbidities and min to Occupational Therapy Note signed by Chun Mendoza at 7/23/2018 12:19 PM  Version 1 of 2    Author:  Chun Mendoza Service:  Rehab Author Type:  OT Student    Filed:  7/23/2018 12:19 PM Date of Service:  7/23/2018  9:23 AM Status:  Esteban Baca Diagnosis Date   • Atherosclerosis of coronary artery    • Back problem    • Calculus of kidney    • Coronary atherosclerosis    • Disorder of liver     \"enlarged\"   • Disorder of thyroid    • Essential hypertension    • Hearing impairment    • Heart att UB/ LB, mod- max assist for showering and towel drying (his wife showers him as he stands/ holds on to a grab bar); wife sponge bathes him when he is feeling more lightheaded, able to transfer Mod Ind w/ RW to the toilet with supervision, toileting perform following;  Right Shoulder flexion  4/5  Left Shoulder flexion  4/5  Left   4-/5[RM. 2]    COORDINATION  Gross Motor[RM.1]    WFL[RM. 2]   Fine Motor    WFL      NEUROLOGICAL FINDINGS  Neurological Findings: Coordination - Finger to Nose;Coordination - R instructions to pause between position changes and to focus on something in room. Patient performed sit <> stand with RW at [facility] w/ 2 minute pause prior to functional ambulation w/ RW.  Patient performed functional ambulation for approx 50 ft w/RW at [facility] lev Specific performance deficits impacting engagement in ADL/IADL[RM.1] MODERATE  3 - 5 performance deficits[RM. 2]   Client Assessment/Performance Deficits[RM. 1] MODERATE - Comorbidities and min to mod modifications of tasks[RM. 2]    Clinical Decision Making[

## (undated) NOTE — ED AVS SNAPSHOT
Salvatore Stout   MRN: [id number]    Department:  [facility] Emergency Department   Date of Visit:  6/22/2018           Disclosure     Insurance plans vary and the physician(s) referred by the ER may not be covered by your plan.  Please contact you CARE PHYSICIAN AT ONCE OR RETURN IMMEDIATELY TO THE EMERGENCY DEPARTMENT. If you have been prescribed any medication(s), please fill your prescription right away and begin taking the medication(s) as directed.   If you believe that any of the medications

## (undated) NOTE — IP AVS SNAPSHOT
Patient Demographics     Address  [address]  Phone  [phone] [facility])  [phone] (Mobile) *Preferred*      Emergency Contact(s)     Name Relation Home Work Mobile    AryanKyunga Spouse   [phone]    AryanTony Son   640 Next dose due: Tomorrow morning      Take 1 tablet (60 mg total) by mouth daily. Anil Samuel MD         Levothyroxine Sodium 200 MCG Tabs  Commonly known as:  SYNTHROID  Next dose due:   Tomorrow morning      Take 200 mcg by mouth before breakfast. [id number] aspirin EC tab 81 mg 06/24/20 0905 Given      [id number] atorvastatin (LIPITOR) tab 40 mg 06/23/20 2130 Given      [id number] bisacodyl (DULCOLAX) rectal suppository 10 mg 06/24/20 0905 Given      [id number] carvedilol (COREG) tab 12.5 mg 06/23/20 No matching results found     Microbiology Results (All)     Procedure Component Value Units Date/Time    Blood Culture FREQ X 2 [[id number]] Collected:  06/21/20 2157    Order Status:  Completed Lab Status:  Preliminary result Updated:  06/23/20 2300    Sp Troponin, first set, was negative. Again, EKG showed sinus rhythm with nonspecific T-wave inversion in the inferior and anterolateral leads. Compared to prior EKGs, no significant changes have been noticed except for increased rate today.   CT scan of the last bowel movement was 2 days ago which is usual for him. Other 12-point review of systems is negative. PHYSICAL EXAMINATION:    GENERAL:  Alert. Oriented to time, place, and person. Mild to moderate distress.    VITAL SIGNS:  Temperature 98.3, p Consults signed by Evelin Dominguez MD at 6/21/2020 11:35 AM     Author:  Evelin Dominguez MD Service:  Nephrology Author Type:  Physician    Filed:  6/21/2020 11:35 AM Date of Service:  6/21/2020 10:54 AM Status:  Signed    :  Evelin Dominguez MD 2 cardiac stents x 2 2006 2x in 2014 3x in 2017       Family History  Family History   Problem Relation Age of Onset   • Heart Disorder Father    • Lipids Father    • Diabetes Paternal Grandmother    • Heart Disorder Paternal Grandfather    • Diabetes Yesica Pena magnesium hydroxide (MILK OF MAGNESIA) 400 MG/5ML suspension 30 mL, 30 mL, Oral, Daily PRN  bisacodyl (DULCOLAX) rectal suppository 10 mg, 10 mg, Rectal, Daily PRN  ondansetron HCl (ZOFRAN) injection 4 mg, 4 mg, Intravenous, Q6H PRN  glucose (DEX4) oral li Temp:  [97.6 °F (36.4 °C)-98.7 °F (37.1 °C)] 98.5 °F (36.9 °C)  Pulse:  [100-109] 105  Resp:  [18-20] 20  BP: (116-169)/() 158/119  SpO2: 95 %     Intake/Output Summary (Last 24 hours) at 6/21/2020 1054  Last data filed at 6/20/2020 7416  Gross per 2 INR   Date Value Ref Range Status   05/28/2019 1.08 0.90 - 1.20 Final     Comment:       Only the INR (not the Protime value) should be utilized for   the monitoring of oral anticoagulant therapy.      Recommended therapeutic ranges for anticoagulant t Discharge Summary signed by Dominique Fleming MD at 6/24/2020 12:24 PM  Version 1 of 1    Author:  Dominique Fleming MD Service:  Hospitalist Author Type:  Physician    Filed:  6/24/2020 12:24 PM Date of Service:  6/24/2020 12:24 PM Status:  Signed    Ed performed standing ADL task with UE support at [facility] to MIN A for balance. Pt with sudden loss of balance d/t R trunk lean when ambulating back to bed, MIN A to correct.  Pt declined to sit in chair, educated on log roll for bed mobility for abdominal precaut Ventral hernia without obstruction or gangrene    Past Medical History  Past Medical History:   Diagnosis Date   • Atherosclerosis of coronary artery    • Back problem    • Calculus of kidney    • Coronary atherosclerosis    • Disorder of liver     \"enl decreased awareness of need for safety  · Awareness of Deficits:  decreased awareness of deficits    RANGE OF MOTION AND STRENGTH ASSESSMENT  Upper extremity ROM and strength are within functional limits     Lower extremity ROM is within functional limits Functional activity tolerated  Gait training  Transfer training    Patient End of Session: Needs met;Call light within reach;RN aware of session/findings; All patient questions and concerns addressed; Alarm set; In bed    CURRENT GOALS    Goals to be met by: Patient is a 64year old male admitted 6/17/2020 for chest pain;ventral hernia. In this OT evaluation patient presents with the following impairments: pain, decreased activity tolerance, limited mobility.   These deficits manifest functionally while perfor OT Treatment Plan: Patient/Family education;Patient/Family training; Endurance training;Functional transfer training;ADL training;Energy conservation/work simplification techniques; Compensatory technique education    OCCUPATIONAL THERAPY MEDICAL/SOCIAL HIST Fall Risk: High fall risk    PAIN ASSESSMENT  Rating: (pt did not rate pain)  Location: (abdomen)  Management Techniques: Body mechanics; Activity promotion    ACTIVITY TOLERANCE  Limited by pain    O2 SATURATIONS  Pt wheezing w/ activity  Sats 100% on room Patient End of Session: In bed;Needs met;Call light within reach;RN aware of session/findings; All patient questions and concerns addressed; Alarm set    OT Goals  Patients self stated goal is: to return home     Patient will complete functional transfer wit

## (undated) NOTE — LETTER
[address], [place]  Authorization for Invasive Procedures  1.  I hereby authorize Dr. Brittany Rose, my physician and whomever may be designated as the doctor's assistant, to perform the following operation and/or procedure: Cardiac cat the event that I wish to have autologous transfusions of my own blood, or a directed donor transfusion, I will discuss this with my physician.      5. I consent to the photographing of the operations or procedures to be performed for the purposes of advanci Responsible person in case of minor or unconscious: _____________________________Relationship: ____________     Witness Signature: ____________________________________________ Date: __________ Time: ___________    Statement of Physician  My signature below